# Patient Record
Sex: FEMALE | Race: BLACK OR AFRICAN AMERICAN | Employment: UNEMPLOYED | URBAN - METROPOLITAN AREA
[De-identification: names, ages, dates, MRNs, and addresses within clinical notes are randomized per-mention and may not be internally consistent; named-entity substitution may affect disease eponyms.]

---

## 2018-09-06 ENCOUNTER — HOSPITAL ENCOUNTER (OUTPATIENT)
Dept: PHYSICAL THERAPY | Age: 43
Discharge: HOME | End: 2018-09-06
Payer: MEDICAID

## 2018-09-06 PROCEDURE — 97162 PT EVAL MOD COMPLEX 30 MIN: CPT

## 2018-09-06 PROCEDURE — 97530 THERAPEUTIC ACTIVITIES: CPT

## 2018-09-06 PROCEDURE — 97110 THERAPEUTIC EXERCISES: CPT

## 2018-09-06 PROCEDURE — 97016 VASOPNEUMATIC DEVICE THERAPY: CPT

## 2018-09-06 NOTE — PROGRESS NOTES
[facility] [address], [place] ram[place], [address] [facility] Phone: [phone]      Fax:  [phone] Plan of Care/ Statement of Necessity for Physical Therapy Services Patient name: Gilma Henderson Start of Care: 2018 Referral source: Raji Ruzi NP : 1975 Medical Diagnosis: Sprain of calcaneofibular ligament of right ankle, subsequent encounter [S93.411D] Onset Date:DOI 17 DOS 18 Treatment Diagnosis: S/P Right Ankle surgery Prior Hospitalization: see medical history Provider#: [id number] Medications: Verified on Patient summary List  
 Comorbidities: HTN, previous surgery, depression, anxiety, BMI >30 Prior Level of Function: prior to injury in December unrestricted and unlimited in daily, work and recreational activities The Plan of Care and following information is based on the information from the initial evaluation. Assessment/ key information:  
Pt is a 40 y.o. Female S/P Right ankle ligament reconstruction on 18. Pt reports initial injury occurred on 17 stepping of bottom step outside home, initially diagnosed with lateral ankle sprain. In February had surgery for fifth metatarsal fx, continued pain and swelling, had second surgery in July. Pt presented to PT NWB with boot on right foot using scooter. Per referral gait training in boot but pt does not have axillary crutches. Will initiate next session with wt shifting in boot in controlled environment. Objective Findings include decreased ROM, visible inflammation, expected strength, gait and balance deficits, visible atrophy and decreased muscle tone. Noted increased sensitivity to light touch, suspect possible fear avoidance. Prior to accident pt was unrestricted in activities. Has 2 young children.  Could benefit from PT to return to daily activities including driving and caring for children.  
  
 Evaluation Complexity History HIGH Complexity :3+ comorbidities / personal factors will impact the outcome/ POC ; Examination HIGH Complexity : 4+ Standardized tests and measures addressing body structure, function, activity limitation and / or participation in recreation  ;Presentation MEDIUM Complexity : Evolving with changing characteristics  ; Clinical Decision Making MEDIUM Complexity : FOTO score of 26-74 Overall Complexity Rating: MEDIUM Problem List: pain affecting function, decrease ROM, decrease strength, edema affecting function, impaired gait/ balance, decrease ADL/ functional abilitiies, decrease activity tolerance and decrease flexibility/ joint mobility Treatment Plan may include any combination of the following: Therapeutic exercise, Therapeutic activities, Neuromuscular re-education, Physical agent/modality, Gait/balance training, Manual therapy, Patient education and Self Care training Patient / Family readiness to learn indicated by: asking questions, trying to perform skills and interest 
Persons(s) to be included in education: patient (P) Barriers to Learning/Limitations: None Patient Goal (s): Be able to walk with a shoe, drive, run again. Use my right foot Patient Self Reported Health Status: fair Rehabilitation Potential: good Short Term Goals: STG- To be accomplished in 2-3 week(s): 1.  Pt will be independent with HEP to encourage prophylaxis. Eval:dispensed Current: NA 
  
2.  Pt will be able to walk WBAT in walking boot without pain to improve functional mobility. Eval:NWB with scooter Current: NA 
  
Long Term Goals: LTG- To be accomplished in 10 week(s): 1.  Pt will demonstrate ability to ascend/descend stairs reciprocally to improve household mobility. Eval:unable Current: NA 
  
2.  Pt will be able to stand and walk >2hours to allow pt to return to daily activities Eval:unable Current: NA 
  
Short Term Goals: STG- To be accomplished in 2 week(s): 
 1.  Pt will be independent with HEP to encourage prophylaxis. Eval:dispensed Current: NA 
  
2.  Pt will be able to walk WBAT in walking boot without pain to improve functional mobility. Eval:NWB with scooter Current: NA 
  
Long Term Goals: LTG- To be accomplished in 10 week(s): 1.  Pt will demonstrate ability to ascend/descend stairs reciprocally to improve household mobility. Eval:unable Current: NA 
  
2.  Pt will be able to stand and walk >2hours performing dynamic activities such as vacuuming, mopping , bending and stooping to allow pt return to job as . Eval:unable Current: NA 
  
3.  Pt increase right ankle ROM to [facility] in all directions to allow for normal gait. Eval: 
AROM/PROM Right Left DF A: lacking 10 P: lacking 8 A: 4*  
PF 45 WFL  
IV 15 30 EV 8 18  
          
   
  
Current: NA 
  
4.  Pt FOTO score will increase by >30 points to show improvement in function. Eval:16 Current: will address at visit 5 
  
 
Frequency / Duration: Patient to be seen 2 times per week for 8-10 weeks. Plan to decrease to 1 time per week at end of PT Patient/ Caregiver education and instruction: Diagnosis, prognosis, self care, activity modification and exercises 
 [x]  Plan of care has been reviewed with FILIPE Garcia, PT, DPT 9/6/2018 1:12 PM 
_____________________________________________________________________ I certify that the above Therapy Services are being furnished while the patient is under my care. I agree with the treatment plan and certify that this therapy is necessary. [facility] Signature:____________Date:_________TIME:________ 
 
[employer], Date and Time must be completed for valid certification ** Please sign and return to [facility] [address], [place], [address] [facility] Phone: [phone]      Fax:  [phone]

## 2018-09-06 NOTE — PROGRESS NOTES
PT DAILY TREATMENT NOTE 3-16 Patient Name: Rut Sinclair Date:2018 : 1975 [x]  Patient  Verified Payor: [employer]UE [employer] MEDICAID / Plan: [address] / Product Type: Managed Care Medicaid / In time: 11:35 am  Out time:12:35 pm  
Total Treatment Time (min): 60 Visit #: 1 of 18 Treatment Area: Sprain of calcaneofibular ligament of right ankle, subsequent encounter [S92.484F] SUBJECTIVE Pain Level (0-10 scale): 3 Any medication changes, allergies to medications, adverse drug reactions, diagnosis change, or new procedure performed?: [x] No    [] Yes (see summary sheet for update) Subjective functional status/changes:   [] No changes reported Hx Present Illness: C/C of Right Ankle pain and swelling In 2017, rolled ankle stepping off of car-port steps Initially went to [facility] - x-rays, given aircast  And crutches, dx with sprain Continued to have swelling and pain from 2017 - 2018, went to [facility] - dx with fx of fifth metatarsal, saw PA on Thursday and had surgery following Monday/Tuesday Had screw placement and casted/splinted x 6 wks NWB, put in boot WBAT in boot from February - July with continued pain 2018 S/P Right ankle ATFL and \"ligament reconstruction\" Last appointment with PA in August - per pt allowed to [facility] a couple steps every hour than then get back on the scooter. \" 
PLOF: prior to injury in December unrestricted and unlimited in daily, work and recreational activities Sharp pains with moving ankle and with weight bearing Pain:  _8__/10 max       __2_/10 min     _3___/10 now    Location: indicates lateral right ankle and malleoli  
   [x] Sharp    [] Dull      [] Burning     []  Aching     [x] Throbbing      [] Tingling 
   [] Other:  
    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 and 3 y.o., single story home Recreational Activities: active with children Patient Goal(s): \"Be able to walk with a shoe, drive, run again. Use my right foot. \" 
 
Barriers: []pain []financial []time []transportation []other Motivation: high Substance use: []Alcohol []Tobacco []other:  
FABQ Score: []low []elevate Cognition: A & O x 4 Other OBJECTIVE Modality rationale: decrease inflammation and decrease pain to improve the patients ability to perform daily activities with decreased pain and symptom levels Min Type Additional Details  
 [] Estim:  []Unatt       []IFC  []Premod []Other:  []w/ice   []w/heat Position: Location:  
 [] Estim: []Att    []TENS instruct  []NMES []Other:  []w/US   []w/ice   []w/heat Position: Location:  
 []  Traction: [] Cervical       []Lumbar 
                     [] Prone          []Supine []Intermittent   []Continuous Lbs: 
[] before manual 
[] after manual  
 []  Ultrasound: []Continuous   [] Pulsed []1MHz   []3MHz Location: 
W/cm2:  
 []  Iontophoresis with dexamethasone Location: [] Take home patch  
[] In clinic  
 []  Ice     []  heat 
[]  Ice massage 
[]  Laser  
[]  Anodyne Position: Location:  
 []  Laser with stim 
[]  Other: Position: Location:  
10 [x]  Vasopneumatic Device Pressure:       [] lo [x] med [] hi  
Temperature: [x] lo [] med [] hi  
[x] Skin assessment post-treatment:  [x]intact []redness- no adverse reaction 
  []redness  adverse reaction:  
 
 
20 min [x]Eval                  []Re-Eval  
 
10 min Therapeutic Exercise:  [x] See flow sheet :  
Rationale: increase ROM, increase strength, improve coordination and increase proprioception to improve the patients ability to perform daily activities with decreased pain and symptom levels With 
 [x] TE 
 [x] TA - 10 min 
 [] neuro 
 [] other: Patient Education: [x] Review HEP [] Progressed/Changed HEP based on:  
[] positioning   [] body mechanics   [] transfers   [] heat/ice application   
[x] other: reviewed exam findings, anatomy involved, POC, use of tubi-, ice and elevation, desensitization techniques and importance of touching incision and ankle at home Other Objective/Functional Measures: Movement/gait:  NWB foot in boot, using a scooter Visual Inspection: Thoracic: flattened Lumbar: increased Incisions: well healed at anterior medial and lateral ankle portal sites and incision at lateral malleoli Palpation: increased sensitivity to light palpation, TTP at incisions Decrease tone at musculature of right LE Visible atrophy at gastroc and soleus Mild increase in temp to palpation of ankle AROM/PROM Right Left DF A: lacking 10 P: lacking 8 A: 4*  
PF 45 WFL  
IV 15 30 EV 8 18 Strength Right Left PF NT 4  
DF NT 5 IV NT 5  
EV NT 5 Special Tests Right Left Other Right Left Girth - malleoli 27.8 cm 25.5 cm Girth - Figure 8 53.1 50.4 Other Comments:  
 
Pain Level (0-10 scale) post treatment: 8 
 
ASSESSMENT/Changes in Function:  
Pt is a 40 y.o. Female S/P Right ankle ligament reconstruction on 7/25/18. Pt reports initial injury occurred on 12/23/17 stepping of bottom step outside home, initially diagnosed with lateral ankle sprain. In February had surgery for fifth metatarsal fx, continued pain and swelling, had second surgery in July. Pt presented to PT NWB with boot on right foot using scooter. Per referral gait training in boot but pt does not have axillary crutches.  Will initiate next session with wt shifting in boot in controlled environment. Objective Findings include decreased ROM, visible inflammation, expected strength, gait and balance deficits, visible atrophy and decreased muscle tone. Noted increased sensitivity to light touch, suspect possible fear avoidance. Prior to accident pt was unrestricted in activities. Has 2 young children. Could benefit from PT to return to daily activities including driving and caring for children. Patient will continue to benefit from skilled PT services to modify and progress therapeutic interventions, address functional mobility deficits, address ROM deficits, address strength deficits, analyze and address soft tissue restrictions, analyze and cue movement patterns, analyze and modify body mechanics/ergonomics, assess and modify postural abnormalities, address imbalance/dizziness and instruct in home and community integration to attain remaining goals. [x]  See Plan of Care 
[]  See progress note/recertification 
[]  See Discharge Summary Progress towards goals / Updated goals: 
Short Term Goals: STG- To be accomplished in 2-3 week(s): 1. Pt will be independent with HEP to encourage prophylaxis. Eval:dispensed Current: NA 
   
2. Pt will be able to walk WBAT in walking boot without pain to improve functional mobility. Eval:NWB with scooter Current: NA Long Term Goals: LTG- To be accomplished in 10 week(s): 1. Pt will demonstrate ability to ascend/descend stairs reciprocally to improve household mobility. Eval:unable Current: NA 
   
2. Pt will be able to stand and walk >2hours to allow pt to return to daily activities Eval:unable Current: NA Short Term Goals: STG- To be accomplished in 2 week(s): 1. Pt will be independent with HEP to encourage prophylaxis. Eval:dispensed Current: NA 
   
2. Pt will be able to walk WBAT in walking boot without pain to improve functional mobility. Eval:NWB with scooter Current: NA 
   
 Long Term Goals: LTG- To be accomplished in 10 week(s): 1. Pt will demonstrate ability to ascend/descend stairs reciprocally to improve household mobility. Eval:unable Current: NA 
   
2. Pt will be able to stand and walk >2hours performing dynamic activities such as vacuuming, mopping , bending and stooping to allow pt return to job as . Eval:unable Current: NA 
 
3. Pt increase right ankle ROM to [facility] in all directions to allow for normal gait. Eval: 
AROM/PROM Right Left DF A: lacking 10 P: lacking 8 A: 4*  
PF 45 WFL  
IV 15 30 EV 8 18  
       
  
 
Current: NA 
   
4. Pt FOTO score will increase by >30 points to show improvement in function. Eval:16 Current: will address at visit 5 PLAN [x]  Upgrade activities as tolerated     []  Continue plan of care 
[]  Update interventions per flow sheet      
[]  Discharge due to:_ 
[]  Other:_ Mark Garcia PT, DPT 9/6/2018  11:31 AM 
 
No future appointments.

## 2018-09-12 ENCOUNTER — APPOINTMENT (OUTPATIENT)
Dept: PHYSICAL THERAPY | Age: 43
End: 2018-09-12
Payer: MEDICAID

## 2018-09-14 ENCOUNTER — APPOINTMENT (OUTPATIENT)
Dept: PHYSICAL THERAPY | Age: 43
End: 2018-09-14
Payer: MEDICAID

## 2018-09-19 ENCOUNTER — APPOINTMENT (OUTPATIENT)
Dept: PHYSICAL THERAPY | Age: 43
End: 2018-09-19
Payer: MEDICAID

## 2018-09-21 ENCOUNTER — APPOINTMENT (OUTPATIENT)
Dept: PHYSICAL THERAPY | Age: 43
End: 2018-09-21
Payer: MEDICAID

## 2018-09-26 ENCOUNTER — APPOINTMENT (OUTPATIENT)
Dept: PHYSICAL THERAPY | Age: 43
End: 2018-09-26
Payer: MEDICAID

## 2018-09-28 ENCOUNTER — APPOINTMENT (OUTPATIENT)
Dept: PHYSICAL THERAPY | Age: 43
End: 2018-09-28
Payer: MEDICAID

## 2018-10-03 ENCOUNTER — APPOINTMENT (OUTPATIENT)
Dept: PHYSICAL THERAPY | Age: 43
End: 2018-10-03
Payer: MEDICAID

## 2018-10-05 ENCOUNTER — HOSPITAL ENCOUNTER (OUTPATIENT)
Dept: PHYSICAL THERAPY | Age: 43
Discharge: HOME | End: 2018-10-05
Payer: MEDICAID

## 2018-10-05 PROCEDURE — 97530 THERAPEUTIC ACTIVITIES: CPT

## 2018-10-05 PROCEDURE — 97016 VASOPNEUMATIC DEVICE THERAPY: CPT

## 2018-10-05 PROCEDURE — 97110 THERAPEUTIC EXERCISES: CPT

## 2018-10-05 NOTE — PROGRESS NOTES
PT DAILY TREATMENT NOTE  Patient Name: Hood Iyer Date:10/5/2018 : 1975 [x]  Patient  Verified Payor: BLUE [employer]OSS MEDICAID / Plan: [address] / Product Type: Managed Care Medicaid / In time:10:00  Out time:11:00 Total Treatment Time (min): 60 Visit #: 2 of 18 Treatment Area: Sprain of calcaneofibular ligament of right ankle, subsequent encounter [L50.067P] SUBJECTIVE Pain Level (0-10 scale): 4 Any medication changes, allergies to medications, adverse drug reactions, diagnosis change, or new procedure performed?: [x] No    [] Yes (see summary sheet for update) Subjective functional status/changes:   [] No changes reported \"I have not been able to come in because of personal reasons. I have tried to do stuff at home. \"  
 
OBJECTIVE Modality rationale: decrease edema, decrease inflammation and decrease pain to improve the patients ability to perform pain free ADL's   
Min Type Additional Details  
 [] Estim:  []Unatt       []IFC  []Premod []Other:  []w/ice   []w/heat Position: Location:  
 [] Estim: []Att    []TENS instruct  []NMES []Other:  []w/US   []w/ice   []w/heat Position: Location:  
 []  Traction: [] Cervical       []Lumbar 
                     [] Prone          []Supine []Intermittent   []Continuous Lbs: 
[] before manual 
[] after manual  
 []  Ultrasound: []Continuous   [] Pulsed []1MHz   []3MHz W/cm2: 
Location:  
 []  Iontophoresis with dexamethasone Location: [] Take home patch  
[] In clinic  
 []  Ice     []  heat 
[]  Ice massage 
[]  Laser  
[]  Anodyne Position: Location:  
 []  Laser with stim 
[]  Other:  Position: Location:  
10 [x]  Vasopneumatic Device  Pressure:       [x] lo [] med [] hi  
Temperature: [x] lo [] med [] hi  
[x] Skin assessment post-treatment:  []intact []redness- no adverse reaction 35 min Therapeutic Exercise:  [x] See flow sheet :  
Rationale: increase ROM, increase strength, improve coordination and improve balance to improve the patients ability to [perform pain free ADL's  
 
15 min Therapeutic Activity:  [x]  See flow sheet :  
Rationale: increase ROM, increase strength, improve coordination and improve balance  to improve the patients ability to perform pain free ALD's With 
 [] TE 
 [] TA 
 [] neuro 
 [] other: Patient Education: [x] Review HEP [] Progressed/Changed HEP based on:  
[] positioning   [] body mechanics   [] transfers   [] heat/ice application   
[] other:   
 
Other Objective/Functional Measures: No change in ROM since eval  
AROM/PROM Right Left DF A: lacking 10 P: lacking 8 A: 4*  
PF 45 WFL  
IV 15 30 EV 8 18  
          
FOTO: see chart     
 
Pain Level (0-10 scale) post treatment:3 
 
ASSESSMENT/Changes in Function: Pt has not been in therapy for one month due to personal reasons. Pt presented in clinic with walkning boot, however reported she has been without boot at home and when doing HEP. Performed exercises without boot, reviewed gait with SPC fo support. Held most standing therex this session. Challenged with ROM exercises. Discussed importance of compliance with therapy. Patient will continue to benefit from skilled PT services to modify and progress therapeutic interventions, address functional mobility deficits, address ROM deficits, address strength deficits, analyze and address soft tissue restrictions, analyze and cue movement patterns, analyze and modify body mechanics/ergonomics, assess and modify postural abnormalities, address imbalance/dizziness and instruct in home and community integration to attain remaining goals. []  See Plan of Care 
[]  See progress note/recertification 
[]  See Discharge Summary Progress towards goals / Updated goals: 
  
Short Term Goals: STG- To be accomplished in 2-3 week(s): 
 1.  Pt will be independent with HEP to encourage prophylaxis. Eval:dispensed Current: Progressing per pt report of compliance with HEP  
   
2.  Pt will be able to walk WBAT in walking boot without pain to improve functional mobility. Eval:NWB with scooter Current: Progressing : Pt presented in therapy with no scooter, cont to wear walking boot  
   
 
 
Long Term Goals: LTG- To be accomplished in 10 week(s): 1.  Pt will demonstrate ability to ascend/descend stairs reciprocally to improve household mobility. Eval:unable Current: no progress due to compliance  
   
2.  Pt will be able to stand and walk >2hours performing dynamic activities such as vacuuming, mopping , bending and stooping to allow pt return to job as . Eval:unable Current: no progress due to compliance 3.  Pt increase right ankle ROM to [facility] in all directions to allow for normal gait. Eval: 
AROM/PROM Right Left DF A: lacking 10 P: lacking 8 A: 4*  
PF 45 WFL  
IV 15 30 EV 8 18  
          
   
   
Current: NA 
   
4.  Pt FOTO score will increase by >30 points to show improvement in function. Eval:16 Current: see chart  
   
  
 
PLAN [x]  Upgrade activities as tolerated     [x]  Continue plan of care 
[]  Update interventions per flow sheet      
[]  Discharge due to:_ 
[]  Other:_ Albert Cullen, PTA 10/5/2018  10:02 AM 
 
No future appointments.

## 2018-10-12 ENCOUNTER — APPOINTMENT (OUTPATIENT)
Dept: PHYSICAL THERAPY | Age: 43
End: 2018-10-12
Payer: MEDICAID

## 2018-10-16 ENCOUNTER — HOSPITAL ENCOUNTER (OUTPATIENT)
Dept: PHYSICAL THERAPY | Age: 43
End: 2018-10-16
Payer: MEDICAID

## 2018-10-18 ENCOUNTER — HOSPITAL ENCOUNTER (OUTPATIENT)
Dept: PHYSICAL THERAPY | Age: 43
End: 2018-10-18
Payer: MEDICAID

## 2018-10-25 ENCOUNTER — HOSPITAL ENCOUNTER (OUTPATIENT)
Dept: PHYSICAL THERAPY | Age: 43
Discharge: HOME | End: 2018-10-25
Payer: MEDICAID

## 2018-10-25 PROCEDURE — 97110 THERAPEUTIC EXERCISES: CPT

## 2018-10-25 PROCEDURE — 97164 PT RE-EVAL EST PLAN CARE: CPT

## 2018-10-25 PROCEDURE — 97016 VASOPNEUMATIC DEVICE THERAPY: CPT

## 2018-10-25 PROCEDURE — 97530 THERAPEUTIC ACTIVITIES: CPT

## 2018-10-25 PROCEDURE — 97140 MANUAL THERAPY 1/> REGIONS: CPT

## 2018-10-25 NOTE — PROGRESS NOTES
PT DAILY TREATMENT NOTE  Patient Name: Whit Greer Date:10/25/2018 : 1975 [x]  Patient  Verified Payor: BLUE [employer] MEDICAID / Plan: [address] / Product Type: Managed Care Medicaid / In time:10:30 am  Out time:11:44 am 
Total Treatment Time (min): 74 Visit #: 3 of 18 Treatment Area: Sprain of calcaneofibular ligament of right ankle, subsequent encounter [Y12.362Y] SUBJECTIVE Pain Level (0-10 scale): 3 Any medication changes, allergies to medications, adverse drug reactions, diagnosis change, or new procedure performed?: [x] No    [] Yes (see summary sheet for update) Subjective functional status/changes:   [] No changes reported \"I am doing well. \" 
Pt reports getting brace 4 days ago. OBJECTIVE Modality rationale: decrease inflammation and decrease pain to improve the patients ability to perform daily activities with decreased pain and symptom levels Min Type Additional Details  
 [] Estim:  []Unatt       []IFC  []Premod []Other:  []w/ice   []w/heat Position: Location:  
 [] Estim: []Att    []TENS instruct  []NMES []Other:  []w/US   []w/ice   []w/heat Position: Location:  
 []  Traction: [] Cervical       []Lumbar 
                     [] Prone          []Supine []Intermittent   []Continuous Lbs: 
[] before manual 
[] after manual  
 []  Ultrasound: []Continuous   [] Pulsed []1MHz   []3MHz W/cm2: 
Location:  
 []  Iontophoresis with dexamethasone Location: [] Take home patch  
[] In clinic  
 []  Ice     []  heat 
[]  Ice massage 
[]  Laser  
[]  Anodyne Position: Location:  
 []  Laser with stim 
[]  Other:  Position: Location:  
10 [x]  Vasopneumatic Device Pressure:       [] lo [x] med [] hi  
Temperature: [x] lo [] med [] hi  
[] Skin assessment post-treatment:  []intact []redness- no adverse reaction 
  []redness  adverse reaction: 10 min -Eval                  X Re-Eval  
 
 
34 min Therapeutic Exercise:  [x] See flow sheet :  
Rationale: increase ROM, increase strength, improve coordination and increase proprioception to improve the patients ability to perform daily activities with decreased pain and symptom levels 10 min Therapeutic Activity:  [x]  See flow sheet : included pt education regarding application of figure 8 brace Rationale: increase ROM, increase strength, improve coordination and increase proprioception  to improve the patients ability to perform daily activities with decreased pain and symptom levels 10 min Manual Therapy:  Gentle TC distraction, STM to incisions and post TC glides grade III/IV Rationale: decrease pain, increase ROM and increase tissue extensibility to perform daily activities with decreased pain and symptom levels With 
 [] TE 
 [x] TA 
 [] neuro 
 [] other: Patient Education: [x] Review HEP [] Progressed/Changed HEP based on:  
[] positioning   [] body mechanics   [] transfers   [] heat/ice application   
[] other:   
 
Other Objective/Functional Measures:  
AROM/PROM Right Left DF A: lacking 5* P: 4* A: 4*  
PF 30* WFL  
IV 20 30 EV 10 18 Pain Level (0-10 scale) post treatment: 2 
 
ASSESSMENT/Changes in Function:  
Pt has only been seen for 3 visits including initial evaluation. Evaluation was on 9/6/18 - pt stated had personal conflicts and then transportation issues. At evaluation pt was NWB, at third follow-up presented in shoes with figure 8 brace on right ankle. Stated received from MD 4-5 days ago. Continues to have low level pain, ROM restrictions, gait/balance/stability deficits and expected strength deficits. Have updated HEP. Continued increased fear avoidance and deconditioned state. Updated HEP and educated on importance of compliance with attendance to HEP. Pt could benefit from continued skilled PT to address, ROM, strength, stability and gait. Appropriately challenged with progression of interventions. Updated HEP. Noted possible deconditioning due to 2 surgeries and increased time NWB on Right LE this year. Patient will continue to benefit from skilled PT services to modify and progress therapeutic interventions, address functional mobility deficits, address ROM deficits, address strength deficits, analyze and address soft tissue restrictions, analyze and cue movement patterns, analyze and modify body mechanics/ergonomics, assess and modify postural abnormalities and instruct in home and community integration to attain remaining goals. []  See Plan of Care [x]  See progress note/recertification 
[]  See Discharge Summary Progress towards goals / Updated goals: 
Short Term Goals: STG- To be accomplished in 2-3 week(s): 1.  Pt will be independent with HEP to encourage prophylaxis. Eval:dispensed Current: Progressing per pt report of compliance with HEP  
   
2.  Pt will be able to walk WBAT in walking boot without pain to improve functional mobility. Eval:NWB with scooter Current: Progressing : Pt presented in therapy with no scooter, cont to wear walking boot  
   
  
  
Long Term Goals: LTG- To be accomplished in 10 week(s): 1.  Pt will demonstrate ability to ascend/descend stairs reciprocally to improve household mobility. Eval:unable Current: no progress, initiated step ups this session 
   
2.  Pt will be able to stand and walk >2hours performing dynamic activities such as vacuuming, mopping , bending and stooping to allow pt care for children and return to PLOF. Eval:unable Current: per pt challenged with walking, standing and daily activities  
  
3.  Pt increase right ankle ROM to [facility] in all directions to allow for normal gait. Eval: 
AROM/PROM Right Left DF A: lacking 10 P: lacking 8 A: 4*  
PF 45 WFL  
IV 15 30 EV 8 18  
          
   
   
Current: progressing AROM/PROM Right Left DF A: lacking 5* 
 P: 4* A: 4*  
PF 30* WFL  
IV 20 30 EV 10 18  
  
   
4.  Pt FOTO score will increase by >30 points to show improvement in function. Eval:16 Current: Reassess at visit 5  
   
PLAN [x]  Upgrade activities as tolerated     []  Continue plan of care 
[]  Update interventions per flow sheet      
[]  Discharge due to:_ 
[]  Other:_ Jen Swan PT, DPT 10/25/2018  10:34 AM 
 
Future Appointments Date Time Provider Regina Momin 10/30/2018 11:30 AM FILIPE Perry [facility]  
11/2/2018 10:00 AM FILIPE Escobedo [facility]

## 2018-10-25 NOTE — PROGRESS NOTES
[facility] at the [address], [place] Regina ram[place], [address] [facility] Phone: [phone]      Fax:  [phone] Progress Note Patient name: Briana Randall Start of Care: 2018 Referral source: Emilia Izaguirre NP : 1975 Medical Diagnosis: Sprain of calcaneofibular ligament of right ankle, subsequent encounter [S93.411D] 
  Onset Date:DOI 17 DOS 18 Treatment Diagnosis: S/P Right Ankle surgery Prior Hospitalization: see medical history Provider#: [id number] Medications: Verified on Patient summary List  
 Comorbidities: HTN, previous surgery, depression, anxiety, BMI >30 Prior Level of Function: prior to injury in December unrestricted and unlimited in daily, work and recreational activities Visits from Start of Care: 3    Missed Visits: 3 Progress Toward Goals:  
Short Term Goals: STG- To be accomplished in 2-3 week(s): 1.  Pt will be independent with HEP to encourage prophylaxis. Eval:dispensed Current: Progressing per pt report of compliance with HEP  
   
2.  Pt will be able to walk WBAT in walking boot without pain to improve functional mobility. Eval:NWB with scooter Current: Progressing : Pt presented in therapy with no scooter, cont to wear walking boot  
   
  
  
Long Term Goals: LTG- To be accomplished in 10 week(s): 1.  Pt will demonstrate ability to ascend/descend stairs reciprocally to improve household mobility. Eval:unable Current: no progress, initiated step ups this session 
   
2.  Pt will be able to stand and walk >2hours performing dynamic activities such as vacuuming, mopping , bending and stooping to allow pt care for children and return to OF. Eval:unable Current: per pt challenged with walking, standing and daily activities  
  
3.  Pt increase right ankle ROM to [facility] in all directions to allow for normal gait. Eval: AROM/PROM Right Left DF A: lacking 10 P: lacking 8 A: 4*  
PF 45 WFL  
IV 15 30 EV 8 18  
          
   
   
Current: progressing AROM/PROM Right Left DF A: lacking 5* P: 4* A: 4*  
PF 30* WFL  
IV 20 30 EV 10 18  
             
   4.  Pt FOTO score will increase by >30 points to show improvement in function. Eval:16 Current: Reassess at visit 5  
   
Key Functional Changes:  
Pt has only been seen for 3 visits including initial evaluation. Evaluation was on 9/6/18 - pt stated had personal conflicts and then transportation issues. At evaluation pt was NWB, at third follow-up presented in shoes with figure 8 brace on right ankle. Stated received from MD 4-5 days ago. Continues to have low level pain, ROM restrictions, gait/balance/stability deficits and expected strength deficits. Have updated HEP. Continued increased fear avoidance and deconditioned state. Updated HEP and educated on importance of compliance with attendance to HEP. Pt could benefit from continued skilled PT to address, ROM, strength, stability and gait. Updated Goals: to be achieved in 4 weeks: 
 Same as unmet above ASSESSMENT/RECOMMENDATIONS: 
[x]Continue therapy per initial plan/protocol at a frequency of  2 x per week for 4 weeks []Continue therapy with the following recommended changes:_____________________      _____________________________________________________________________ []Discontinue therapy progressing towards or have reached established goals []Discontinue therapy due to lack of appreciable progress towards goals []Discontinue therapy due to lack of attendance or compliance []Await Physician's recommendations/decisions regarding therapy []Other:________________________________________________________________ Thank you for this referral.  
Janette Iqbal, PT, DPT 10/25/2018 12:58 PM 
NOTE TO PHYSICIAN:  PLEASE COMPLETE THE ORDERS BELOW AND  
FAX TO [facility]: [phone] If you are unable to process this request in 24 hours please contact our office: [phone] []  I have read the above report and request that my patient continue as recommended. []  I have read the above report and request that my patient continue therapy with the following changes/special instructions:________________________________________ []I have read the above report and request that my patient be discharged from therapy.  
 
[de-identified] Signature:____________Date:_________TIME:________ 
 
[employer], Date and Time must be completed for valid certification **

## 2018-10-30 ENCOUNTER — HOSPITAL ENCOUNTER (OUTPATIENT)
Dept: PHYSICAL THERAPY | Age: 43
Discharge: HOME | End: 2018-10-30
Payer: MEDICAID

## 2018-10-30 PROCEDURE — 97016 VASOPNEUMATIC DEVICE THERAPY: CPT

## 2018-10-30 PROCEDURE — 97110 THERAPEUTIC EXERCISES: CPT

## 2018-10-30 PROCEDURE — 97140 MANUAL THERAPY 1/> REGIONS: CPT

## 2018-10-30 NOTE — PROGRESS NOTES
PT DAILY TREATMENT NOTE - [facility]  Patient Name: Hood Iyer Date:10/30/2018 : 1975 [x]  Patient  Verified Payor: [employer]ICAID / Plan: [address] / Product Type: Managed Care Medicaid / In time:10:36  Out time:12:46 Total Treatment Time (min): 70 Total Timed Codes (min): 60 
1:1 Treatment Time ( only): 45 Visit #: 4 of 18 Treatment Area: Sprain of calcaneofibular ligament of right ankle, subsequent encounter [L25.706B] SUBJECTIVE Pain Level (0-10 scale): 3 Any medication changes, allergies to medications, adverse drug reactions, diagnosis change, or new procedure performed?: [x] No    [] Yes (see summary sheet for update) Subjective functional status/changes:   [] No changes reported \"Its tender. I want to be able to walk on it. \" OBJECTIVE Modality rationale: decrease edema, decrease inflammation and decrease pain to improve the patients ability to perform pain free ADl's   
Min Type Additional Details  
 [] Estim:  []Unatt       []IFC  []Premod []Other:  []w/ice   []w/heat Position: Location:  
 [] Estim: []Att    []TENS instruct  []NMES []Other:  []w/US   []w/ice   []w/heat Position: Location:  
 []  Traction: [] Cervical       []Lumbar 
                     [] Prone          []Supine []Intermittent   []Continuous Lbs: 
[] before manual 
[] after manual  
 []  Ultrasound: []Continuous   [] Pulsed []1MHz   []3MHz W/cm2: 
Location:  
 []  Iontophoresis with dexamethasone Location: [] Take home patch  
[] In clinic  
 []  Ice     []  heat 
[]  Ice massage 
[]  Laser  
[]  Anodyne Position: Location:  
 []  Laser with stim 
[]  Other:  Position: Location:  
10 [x]  Vasopneumatic Device right ankle  Pressure:       [x] lo [] med [] hi  
Temperature: [x] lo [] med [] hi  
[x] Skin assessment post-treatment:  []intact []redness- no adverse reaction []redness  adverse reaction:  
 
 
45 min Therapeutic Exercise:  [x] See flow sheet :  
Rationale: increase ROM, increase strength and improve coordination to improve the patients ability to perform pain free ADl's  
 
15 min Manual Therapy:  Gentle Graston technique with GT-3 to lateral scar and TC joint. Manual TC mobs in post direction with DF. Rationale: decrease pain, increase ROM and increase tissue extensibility to perform pain free ADL's With 
 [] TE 
 [] TA 
 [] neuro 
 [] other: Patient Education: [x] Review HEP [] Progressed/Changed HEP based on:  
[] positioning   [] body mechanics   [] transfers   [] heat/ice application   
[] other:   
 
Other Objective/Functional Measures:   
Poor balance with SLS on righ ton foam.  
Very TTP along lateral foot. Pain Level (0-10 scale) post treatment: 4 
 
ASSESSMENT/Changes in Function: Pt continued to c/o pain in lateral foot with ambulation and very TTP. Noted inflammation pocket at lateral malleolus. Tolerated Graston well however very tender. Pt was also very challenged with SLS. Increased pain reported after treatment . Patient will continue to benefit from skilled PT services to modify and progress therapeutic interventions, address functional mobility deficits, address ROM deficits, address strength deficits, analyze and address soft tissue restrictions, analyze and cue movement patterns, analyze and modify body mechanics/ergonomics, assess and modify postural abnormalities, address imbalance/dizziness and instruct in home and community integration to attain remaining goals. []  See Plan of Care 
[]  See progress note/recertification 
[]  See Discharge Summary Progress towards goals / Updated goals: 
Progress Toward Goals:  
Short Term Goals: STG- To be accomplished in 2-3 week(s): 1.  Pt will be independent with HEP to encourage prophylaxis. Eval:dispensed Current: Progressing per pt report of compliance with HEP  
    
2.  Pt will be able to walk WBAT in walking boot without pain to improve functional mobility. Eval:NWB with scooter Current: Progressing : Pt presented in therapy with no scooter, cont to wear walking boot  
   
  
  
Long Term Goals: LTG- To be accomplished in 10 week(s): 1.  Pt will demonstrate ability to ascend/descend stairs reciprocally to improve household mobility. Eval:unable Current: no progress, initiated step ups this session 
   
2.  Pt will be able to stand and walk >2hours performing dynamic activities such as vacuuming, mopping , bending and stooping to allow pt care for children and return to OF. Eval:unable Current: per pt challenged with walking, standing and daily activities  
  
3.  Pt increase right ankle ROM to [facility] in all directions to allow for normal gait. Eval: 
AROM/PROM Right Left DF A: lacking 10 P: lacking 8 A: 4*  
PF 45 WFL  
IV 15 30 EV 8 18  
          
   
   
Current: progressing AROM/PROM Right Left DF A: lacking 5* P: 4* A: 4*  
PF 30* WFL  
IV 20 30 EV 10 18  
  
   
4.  Pt FOTO score will increase by >30 points to show improvement in function. Eval:16 Current: Reassess at visit 5  
   
Key Functional Changes:  
Pt has only been seen for 3 visits including initial evaluation. Evaluation was on 9/6/18 - pt stated had personal conflicts and then transportation issues. At evaluation pt was NWB, at third follow-up presented in shoes with figure 8 brace on right ankle. Stated received from MD 4-5 days ago. Continues to have low level pain, ROM restrictions, gait/balance/stability deficits and expected strength deficits. Have updated HEP. Continued increased fear avoidance and deconditioned state. Updated HEP and educated on importance of compliance with attendance to HEP. Pt could benefit from continued skilled PT to address, ROM, strength, stability and gait.  
  
  
Updated Goals: to be achieved in 4 weeks: 
            Same as unmet above PLAN 
 [x]  Upgrade activities as tolerated     [x]  Continue plan of care 
[]  Update interventions per flow sheet      
[]  Discharge due to:_ 
[]  Other:_ Immanuel FILIPE Villanueva 10/30/2018  11:51 AM 
 
Future Appointments Date Time Provider Regina Momin 11/2/2018 10:00 AM Leny Avila PTA [facility]

## 2018-11-02 ENCOUNTER — APPOINTMENT (OUTPATIENT)
Dept: PHYSICAL THERAPY | Age: 43
End: 2018-11-02
Payer: MEDICAID

## 2018-11-30 ENCOUNTER — HOSPITAL ENCOUNTER (OUTPATIENT)
Dept: PHYSICAL THERAPY | Age: 43
Discharge: HOME | End: 2018-11-30
Payer: MEDICAID

## 2018-11-30 PROCEDURE — 97016 VASOPNEUMATIC DEVICE THERAPY: CPT

## 2018-11-30 PROCEDURE — 97110 THERAPEUTIC EXERCISES: CPT

## 2018-11-30 PROCEDURE — 97530 THERAPEUTIC ACTIVITIES: CPT

## 2018-11-30 NOTE — PROGRESS NOTES
PT DAILY TREATMENT NOTE  Patient Name: Fadumo Dillard Date:2018 : 1975 [x]  Patient  Verified Payor: BLUE [employer]OSS MEDICAID / Plan: [address] / Product Type: Managed Care Medicaid / In time:9:46  Out time:11:00 Total Treatment Time (min): 74 Visit #: 5 of 18 Treatment Area: Sprain of calcaneofibular ligament of right ankle, subsequent encounter [H01.949B] SUBJECTIVE Pain Level (0-10 scale): 4 Any medication changes, allergies to medications, adverse drug reactions, diagnosis change, or new procedure performed?: [x] No    [] Yes (see summary sheet for update) Subjective functional status/changes:   [] No changes reported \"I still have a pain on the outside of the foot. I don't do stairs. I go to the doctor net week. \"  
 
OBJECTIVE Modality rationale: decrease inflammation and decrease pain to improve the patients ability to perform pain free ADLS\" Type Additional Details  
[] Estim:  []Unatt       []IFC  []Premod []Other:  []w/ice   []w/heat Position: Location:  
[] Estim: []Att    []TENS instruct  []NMES []Other:  []w/US   []w/ice   []w/heat Position: Location:  
[]  Traction: [] Cervical       []Lumbar 
                     [] Prone          []Supine []Intermittent   []Continuous Lbs: 
[] before manual 
[] after manual  
[]  Ultrasound: []Continuous   [] Pulsed []1MHz   []3MHz W/cm2: 
Location:  
[]  Iontophoresis with dexamethasone Location: [] Take home patch  
[] In clinic  
[]  Ice     []  heat 
[]  Ice massage 
[]  Laser  
[]  Anodyne Position: Location:  
[]  Laser with stim 
[]  Other:  Position: Location:  
[x]  Vasopneumatic Device 10\" Left ankle  Pressure:       [x] lo [] med [] hi  
Temperature: [x] lo [] med [] hi  
[x] Skin assessment post-treatment:  []intact []redness- no adverse reaction 
  []redness  adverse reaction: 49 min Therapeutic Exercise:  [x] See flow sheet :  
Rationale: increase ROM, increase strength, improve coordination and improve balance to improve the patients ability to perform pain free ADL\"s 15 min Therapeutic Activity:  [x]  See flow sheet :  
Rationale: increase ROM, increase strength, improve coordination and improve balance  to improve the patients ability to perform pain free ADL's With 
 [] TE 
 [] TA 
 [] neuro 
 [] other: Patient Education: [x] Review HEP [] Progressed/Changed HEP based on:  
[] positioning   [] body mechanics   [] transfers   [] heat/ice application   
[] other:   
 
Other Objective/Functional Measures:   
 
AROM/PROM Right Left DF A: lacking 5* 
P: 6* A: 4*  
PF 50* WFL  
IV 25* 30  
EV 16* 18  
STRENGTH     
DF 3+   
EV 4-   
IV 4- FOTO: 32.34 Pain Level (0-10 scale) post treatment: 1 ASSESSMENT/Changes in Function: Pt has been seen 5x in therapy including initial eval on 9/06/18. Pt has been inconsistent with  attendance in therapy and therefore has demonstrated poor progress and continued pain. Pt   reported limited performance of HEP and has not been compliant . Continues to avoid stairs due to fear and pain , unable to move ankle into eversion/ Inversion with lateral foot pain. Unable to tolerate prolonged standing longer than 15 minutes per pt report due to pain and instability. Pt wants to be able to drive. Patient will continue to benefit from skilled PT services to modify and progress therapeutic interventions, address functional mobility deficits, address ROM deficits, address strength deficits, analyze and address soft tissue restrictions, analyze and cue movement patterns, analyze and modify body mechanics/ergonomics, assess and modify postural abnormalities, address imbalance/dizziness and instruct in home and community integration to attain remaining goals. []  See Plan of Care 
[]  See progress note/recertification []  See Discharge Summary Progress towards goals / Updated goals: 
 
Short Term Goals: STG- To be accomplished in 2-3 week(s): 1.  Pt will be independent with HEP to encourage prophylaxis. Eval:dispensed LAST PN : Progressing per pt report of compliance with HEP  
CURRENT : Not compliant  
   
2.  Pt will be able to walk WBAT in walking boot without pain to improve functional mobility. Eval:NWB with scooter LAST PN: Progressing : Pt presented in therapy with no scooter, cont to wear walking boot  
 
CURRENT: No walking boot. Pt continues to wear wrap up brace.  
   
  
  
Long Term Goals: LTG- To be accomplished in 10 week(s): 1.  Pt will demonstrate ability to ascend/descend stairs reciprocally to improve household mobility. Eval:unable LAST PN:  no progress, initiated step ups this session CURRENT: Progressing slow, continues to c/o pain with steps 
   
2.  Pt will be able to stand and walk >2hours performing dynamic activities such as vacuuming, mopping , bending and stooping to allow pt care for children and return to [facility]. Eval:unable LAST PN : per pt challenged with walking, standing and daily activities CURRENT: Pt is unable to stand longer than 15 minutes for ADL's , per pt report.  
  
3.  Pt increase right ankle ROM to [facility] in all directions to allow for normal gait. Eval: 
AROM/PROM Right Left DF A: lacking 10 P: lacking 8 A: 4*  
PF 45 WFL  
IV 15 30 EV 8 18  
          
   
   
LAST PNt: progressing AROM/PROM Right Left DF A: lacking 5* P: 4* A: 4*  
PF 30* WFL  
IV 20 30 EV 10 18  
  
Current:  
 
AROM/PROM Right Left DF A: lacking 5* 
P: 6* A: 4*  
PF 50* WFL  
IV 25* 30  
EV 16* 18  
STRENGTH     
DF 3+   
EV 4-   
IV 4-   
 
   
4.  Pt FOTO score will increase by >30 points to show improvement in function. Eval:16 LAST PN: take at Visit 5 CURRENT: Progressing 32.34 
 
 
 
PLAN [x]  Upgrade activities as tolerated     [x]  Continue plan of care []  Update interventions per flow sheet      
[]  Discharge due to:_ 
[]  Other:_ hSen Ac PTA 11/30/2018  9:38 AM 
 
Future Appointments Date Time Provider Regina Momin 11/30/2018 10:00 AM Emmy LUEVANOGREGORY [facility]  
12/3/2018 12:30 PM Dena Pandey PTA [facility]

## 2018-12-03 ENCOUNTER — APPOINTMENT (OUTPATIENT)
Dept: PHYSICAL THERAPY | Age: 43
End: 2018-12-03

## 2020-06-09 ENCOUNTER — DOCUMENTATION ONLY (OUTPATIENT)
Dept: PHYSICAL THERAPY | Age: 45
End: 2020-06-09

## 2020-06-09 NOTE — PROGRESS NOTES
[facility]  [address] [facility]  Ph [phone]  Fx [phone]    Physical Therapy Discharge Summary    Patient Name: Fabienne Gaffney : 1975   Treatment/Medical Diagnosis: Right ankle pain   Referral Source: Mariel Hester     Date of Initial Visit: 18 Attended Visits: 5 Missed Visits: 5       SUMMARY OF TREATMENT  Pt attended  initial evaluation and     4     follow-ups and then did not return. Therefore a formal reassessment of goals was not performed. RECOMMENDATIONS  Discontinue physical therapy due to patient not returning. If you have any questions/comments please contact us directly at [phone]. Thank you for allowing us to assist in the care of your patient.     Therapist Signature: Cassi Lerner PT Date: 20     Time: 1:26 PM

## 2020-07-17 NOTE — H&P (VIEW-ONLY)
[address], [address] [place], [address] [facility] 
[address] [facility], [address] [place], [address] 
 [phone] Tisha Ramsey  Patient ID: 
Name:  Kelsey Langley MRN:  [id number] :  1975/44 y.o. Date:  2020 HISTORY OF PRESENT ILLNESS: 
Kelsey Langley is a 38 y.o.  premenopausal female referred by Dr. Radha Katz for abnormal uterine bleeding. She initially presented to gyn with c/o AUB. Patient states that she hadn't had a cycle since 2019, but started a very heavy, painful cycle on 20. Patient is s/p endometrial ablation 2019. TVUS suggestive of endometrial polyps, report below. Dr. Radha Katz offered hysteroscopy to remove polyps, but patient desires hysterectomy. She reports h/o large pelvic abscess after tubal ligation, with exlap and LSO. She developed postoperative PE as well. She presents today for further management. Currently, not having any bleeding. Pathology: 
None Labs: 
None Imaging: 
TVUS Scanned in media ROS:  
As above Patient Active Problem List  
 Diagnosis Date Noted  Severe obesity ([address] 2020 Past Medical History:  
Diagnosis Date  Depression  Hypertension Past Surgical History:  
Procedure Laterality Date  BREAST SURGERY PROCEDURE UNLISTED Augmentation/Lift  HX GYN Left tube and ovary ligation  HX ORTHOPAEDIC Bunion  HX ORTHOPAEDIC Rt ankle OB History Marylene Danish 3 Para 3 Term  AB Living 3 SAB  
   
 TAB Ectopic Molar Multiple Live Births Obstetric Comments 3 vaginal births Social History Tobacco Use  Smoking status: Former Smoker  Smokeless tobacco: Never Used Substance Use Topics  Alcohol use: Not Currently History reviewed. No pertinent family history. Current Outpatient Medications Medication Sig  lamoTRIgine (LaMICtal) 150 mg tablet TAKE 1 TABLET BY MOUTH EVERY DAY  amLODIPine (NORVASC) 10 mg tablet TAKE 1 TABLET BY MOUTH EVERY DAY  Aimovig Autoinjector 140 mg/mL injection INJECT (140MG) BY SUBCUTANEOUS ROUTE EVERY MONTH IN THE ABDOMEN, THIGH, OR OUTER AREA OF UPPER ARM  Desvenlafaxine 100 mg Tb24   
 ibuprofen (MOTRIN) 800 mg tablet  LORazepam (ATIVAN) 1 mg tablet TAKE 1/2 TO 1 TABLET BY MOUTH DAILY AS NEEDED FOR ANXIETY  ergocalciferol (ERGOCALCIFEROL) 1,250 mcg (50,000 unit) capsule TAKE 1 CAPSULE BY MOUTH ONE TIME PER WEEK No current facility-administered medications for this visit. No Known Allergies OBJECTIVE: 
 
Physical Exam 
VITAL SIGNS: Visit Vitals BP (!) 149/102 (BP 1 Location: Right arm, BP Patient Position: Sitting) Pulse 85 Temp 98.3 °F (36.8 °C) (Oral) Resp 12 Ht 5' 7\" (1.702 m) Wt 102.6 kg (226 lb 3.2 oz) LMP 07/07/2020 SpO2 96% BMI 35.43 kg/m² GENERAL MOISES: in no apparent distress and well developed and well nourished MUSCULOSKEL: no joint tenderness, deformity or swelling INTEGUMENT:  warm and dry, no rashes or lesions ABDOMEN . soft, NT, ND, No masses appreciated EXTREMITIES: extremities normal, atraumatic, no cyanosis or edema PELVIC: Vulva and vagina appear normal. Bimanual exam reveals normal uterus and adnexa. RECTAL: deferred YONG SURVEY: Cervical, supraclavicular, axillary and inguinal nodes normal.  
NEURO: Grossly normal  
 
 
 
IMPRESSION/PLAN: 
1. Menorrhagia, endometrial polyp 
 -pt desires definitive treatment 
 -will plan for hysterectomy, right salpingectomy 
 -discussed robotic approach 
 -Risks, benefits and alternatives of surgery discussed in detail. Risks including bleeding, infection, blood clot, injury to nearby organs including bladder, bowel, ureters and blood vessels. -surgery to be scheduled at [facility] on 8/6 The total time spent was 60 minutes regarding this patients diagnosis of menorrhagia and >50% of this time was spent counseling and coordinating care Bernice Florez DO Gynecologic Oncology 7/20/202011:12 AM

## 2020-07-17 NOTE — PROGRESS NOTES
[address] [facility]  [address] [place] [address], [address], [address] [facility]  [address] [facility], [address] [place] [address]  [place], [address]   [phone]  Guy        Patient ID:  Name:  Silvia Adrian  MRN:  [id number]  :  1975/44 y.o. Date:  2020      HISTORY OF PRESENT ILLNESS:  Silvia Adrian is a 38 y.o.  premenopausal female referred by Dr. Cheli Monge for abnormal uterine bleeding. She initially presented to gyn with c/o AUB. Patient states that she hadn't had a cycle since 2019, but started a very heavy, painful cycle on 20. Patient is s/p endometrial ablation 2019. TVUS suggestive of endometrial polyps, report below. Dr. Cheli Monge offered hysteroscopy to remove polyps, but patient desires hysterectomy. She reports h/o large pelvic abscess after tubal ligation, with exlap and LSO. She developed postoperative PE as well. She presents today for further management. Currently, not having any bleeding. Pathology:  None     Labs:  None       Imaging:  TVUS  Scanned in media    ROS:   As above      Patient Active Problem List    Diagnosis Date Noted    Severe obesity ([address] 2020     Past Medical History:   Diagnosis Date    Depression     Hypertension       Past Surgical History:   Procedure Laterality Date    BREAST SURGERY PROCEDURE UNLISTED      Augmentation/Lift     HX GYN      Left tube and ovary ligation     HX ORTHOPAEDIC      Bunion     HX ORTHOPAEDIC      Rt ankle       OB History        3    Para   3    Term                AB        Living   3       SAB        TAB        Ectopic        Molar        Multiple        Live Births              Obstetric Comments   3 vaginal births            Social History     Tobacco Use    Smoking status: Former Smoker    Smokeless tobacco: Never Used   Substance Use Topics    Alcohol use: Not Currently      History reviewed.  No pertinent family history. Current Outpatient Medications   Medication Sig    lamoTRIgine (LaMICtal) 150 mg tablet TAKE 1 TABLET BY MOUTH EVERY DAY    amLODIPine (NORVASC) 10 mg tablet TAKE 1 TABLET BY MOUTH EVERY DAY    Aimovig Autoinjector 140 mg/mL injection INJECT (140MG) BY SUBCUTANEOUS ROUTE EVERY MONTH IN THE ABDOMEN, THIGH, OR OUTER AREA OF UPPER ARM    Desvenlafaxine 100 mg Tb24     ibuprofen (MOTRIN) 800 mg tablet     LORazepam (ATIVAN) 1 mg tablet TAKE 1/2 TO 1 TABLET BY MOUTH DAILY AS NEEDED FOR ANXIETY    ergocalciferol (ERGOCALCIFEROL) 1,250 mcg (50,000 unit) capsule TAKE 1 CAPSULE BY MOUTH ONE TIME PER WEEK     No current facility-administered medications for this visit. No Known Allergies       OBJECTIVE:    Physical Exam  VITAL SIGNS: Visit Vitals  BP (!) 149/102 (BP 1 Location: Right arm, BP Patient Position: Sitting)   Pulse 85   Temp 98.3 °F (36.8 °C) (Oral)   Resp 12   Ht 5' 7\" (1.702 m)   Wt 102.6 kg (226 lb 3.2 oz)   LMP 07/07/2020   SpO2 96%   BMI 35.43 kg/m²      GENERAL MOISES: in no apparent distress and well developed and well nourished   MUSCULOSKEL: no joint tenderness, deformity or swelling   INTEGUMENT:  warm and dry, no rashes or lesions   ABDOMEN . soft, NT, ND, No masses appreciated   EXTREMITIES: extremities normal, atraumatic, no cyanosis or edema   PELVIC: Vulva and vagina appear normal. Bimanual exam reveals normal uterus and adnexa. RECTAL: deferred   YONG SURVEY: Cervical, supraclavicular, axillary and inguinal nodes normal.   NEURO: Grossly normal         IMPRESSION/PLAN:  1. Menorrhagia, endometrial polyp   -pt desires definitive treatment   -will plan for hysterectomy, right salpingectomy   -discussed robotic approach   -Risks, benefits and alternatives of surgery discussed in detail.  Risks including bleeding, infection, blood clot, injury to nearby organs including bladder, bowel, ureters and blood vessels.    -surgery to be scheduled at [facility] on 8/6  The total time spent was 60 minutes regarding this patients diagnosis of menorrhagia and >50% of this time was spent counseling and coordinating care    [facility]  7/20/202011:12 AM

## 2020-07-20 ENCOUNTER — OFFICE VISIT (OUTPATIENT)
Dept: ONCOLOGY | Age: 45
End: 2020-07-20

## 2020-07-20 ENCOUNTER — HOSPITAL ENCOUNTER (OUTPATIENT)
Dept: PREADMISSION TESTING | Age: 45
Discharge: HOME | End: 2020-07-20
Payer: MEDICARE

## 2020-07-20 VITALS
WEIGHT: 226.2 LBS | HEART RATE: 85 BPM | BODY MASS INDEX: 35.5 KG/M2 | SYSTOLIC BLOOD PRESSURE: 149 MMHG | OXYGEN SATURATION: 96 % | DIASTOLIC BLOOD PRESSURE: 102 MMHG | TEMPERATURE: 98.3 F | HEIGHT: 67 IN | RESPIRATION RATE: 12 BRPM

## 2020-07-20 DIAGNOSIS — E66.01 SEVERE OBESITY (HCC): ICD-10-CM

## 2020-07-20 DIAGNOSIS — Z01.818 PREOP TESTING: Primary | ICD-10-CM

## 2020-07-20 DIAGNOSIS — N92.0 MENORRHAGIA WITH REGULAR CYCLE: ICD-10-CM

## 2020-07-20 DIAGNOSIS — Z01.818 PREOP TESTING: ICD-10-CM

## 2020-07-20 LAB
ANION GAP SERPL CALC-SCNC: 3 MMOL/L (ref 3–18)
BASOPHILS # BLD: 0 K/UL (ref 0–0.1)
BASOPHILS NFR BLD: 1 % (ref 0–2)
BUN SERPL-MCNC: 9 MG/DL (ref 7–18)
BUN/CREAT SERPL: 12 (ref 12–20)
CALCIUM SERPL-MCNC: 8.5 MG/DL (ref 8.5–10.1)
CHLORIDE SERPL-SCNC: 104 MMOL/L (ref 100–111)
CO2 SERPL-SCNC: 32 MMOL/L (ref 21–32)
CREAT SERPL-MCNC: 0.75 MG/DL (ref 0.6–1.3)
DIFFERENTIAL METHOD BLD: ABNORMAL
EOSINOPHIL # BLD: 0.1 K/UL (ref 0–0.4)
EOSINOPHIL NFR BLD: 3 % (ref 0–5)
ERYTHROCYTE [DISTWIDTH] IN BLOOD BY AUTOMATED COUNT: 12.6 % (ref 11.6–14.5)
GLUCOSE SERPL-MCNC: 81 MG/DL (ref 74–99)
HCG SERPL-ACNC: <1 MIU/ML (ref 0–10)
HCT VFR BLD AUTO: 41 % (ref 35–45)
HGB BLD-MCNC: 13.7 G/DL (ref 12–16)
LYMPHOCYTES # BLD: 1.7 K/UL (ref 0.9–3.6)
LYMPHOCYTES NFR BLD: 42 % (ref 21–52)
MCH RBC QN AUTO: 31.4 PG (ref 24–34)
MCHC RBC AUTO-ENTMCNC: 33.4 G/DL (ref 31–37)
MCV RBC AUTO: 93.8 FL (ref 74–97)
MONOCYTES # BLD: 0.3 K/UL (ref 0.05–1.2)
MONOCYTES NFR BLD: 6 % (ref 3–10)
NEUTS SEG # BLD: 1.9 K/UL (ref 1.8–8)
NEUTS SEG NFR BLD: 48 % (ref 40–73)
PLATELET # BLD AUTO: 314 K/UL (ref 135–420)
PMV BLD AUTO: 10.7 FL (ref 9.2–11.8)
POTASSIUM SERPL-SCNC: 3.5 MMOL/L (ref 3.5–5.5)
RBC # BLD AUTO: 4.37 M/UL (ref 4.2–5.3)
SODIUM SERPL-SCNC: 139 MMOL/L (ref 136–145)
WBC # BLD AUTO: 4.1 K/UL (ref 4.6–13.2)

## 2020-07-20 PROCEDURE — 85025 COMPLETE CBC W/AUTO DIFF WBC: CPT

## 2020-07-20 PROCEDURE — 84702 CHORIONIC GONADOTROPIN TEST: CPT

## 2020-07-20 PROCEDURE — 36415 COLL VENOUS BLD VENIPUNCTURE: CPT

## 2020-07-20 PROCEDURE — 80048 BASIC METABOLIC PNL TOTAL CA: CPT

## 2020-07-20 RX ORDER — AMLODIPINE BESYLATE 10 MG/1
10 TABLET ORAL DAILY
COMMUNITY
Start: 2020-06-28

## 2020-07-20 RX ORDER — LORAZEPAM 1 MG/1
TABLET ORAL
COMMUNITY
Start: 2020-05-31

## 2020-07-20 RX ORDER — LAMOTRIGINE 150 MG/1
150 TABLET ORAL DAILY
COMMUNITY
Start: 2020-06-28

## 2020-07-20 RX ORDER — ERGOCALCIFEROL 1.25 MG/1
CAPSULE ORAL
COMMUNITY
Start: 2020-07-05

## 2020-07-20 RX ORDER — ERENUMAB-AOOE 140 MG/ML
INJECTION, SOLUTION SUBCUTANEOUS
COMMUNITY
Start: 2020-05-31

## 2020-07-20 RX ORDER — IBUPROFEN 800 MG/1
800 TABLET ORAL AS NEEDED
COMMUNITY
Start: 2020-07-14

## 2020-07-20 RX ORDER — DESVENLAFAXINE 100 MG/1
TABLET, EXTENDED RELEASE ORAL DAILY
COMMUNITY
Start: 2020-07-14

## 2020-07-20 NOTE — LETTER
7/20/20 Patient: Moreno Galicia YOB: 1975 Date of Visit: 7/20/2020 Myranda Logan, [address] [facility] [address] [place] [address] VIA Facsimile: [phone] Dear Myranda Logan DO, Thank you for referring Ms. Savana Mejia to [facility] for evaluation. My notes for this consultation are attached. If you have questions, please do not hesitate to call me. I look forward to following your patient along with you. Sincerely, Ibrahima Almanza MD

## 2020-07-20 NOTE — PROGRESS NOTES
Bernabe Alfred is a 38 y.o. female presents in office for hysterectomy     Chief Complaint   Patient presents with    Referral / Consult     Dr. Ariel Klinefelter       Patient states has had 3 pregnancies and 3 vaginal births. Patient states has had a Hx of left tubal ligation with removal of left ovary. Patient states menstrual cycle was absent for 7 months. Patient states menstrual cycle returned May 2020. Patient states menstrual cycle is has heavy flow with clotting and cramping. Patient states constantly feels drained and lightheaded during menstrual cycle. Do you have any unusual vaginal bleeding, discharge or irritation? No     Do you have any changes in your bowel movements? No     Have you been experiencing any continuous or worsening abdominal pain? No    Any urinary burning? No     Visit Vitals  BP (!) 149/102 (BP 1 Location: Right arm, BP Patient Position: Sitting)   Pulse 85   Temp 98.3 °F (36.8 °C) (Oral)   Resp 12   Ht 5' 7\" (1.702 m)   Wt 226 lb 3.2 oz (102.6 kg)   SpO2 96%   BMI 35.43 kg/m²         Health Maintenance Due   Topic Date Due    DTaP/Tdap/Td series (1 - Tdap) 09/12/1997    PAP AKA CERVICAL CYTOLOGY  09/12/1997    Lipid Screen  09/12/2016         1. Have you been to the ER, urgent care clinic since your last visit? Hospitalized since your last visit? No     2. Have you seen or consulted any other health care providers outside of the [address] [place] [address] since your last visit? Include any pap smears or colon screening.   No     3 most recent PHQ Screens 7/20/2020   Little interest or pleasure in doing things Not at all   Feeling down, depressed, irritable, or hopeless Not at all   Total Score PHQ 2 0

## 2020-07-31 ENCOUNTER — HOSPITAL ENCOUNTER (OUTPATIENT)
Dept: PREADMISSION TESTING | Age: 45
Discharge: HOME | End: 2020-07-31
Payer: MEDICARE

## 2020-07-31 DIAGNOSIS — Z01.818 PREOP TESTING: ICD-10-CM

## 2020-07-31 LAB
ATRIAL RATE: 74 BPM
CALCULATED P AXIS, ECG09: 48 DEGREES
CALCULATED R AXIS, ECG10: 76 DEGREES
CALCULATED T AXIS, ECG11: 54 DEGREES
DIAGNOSIS, 93000: NORMAL
P-R INTERVAL, ECG05: 152 MS
Q-T INTERVAL, ECG07: 362 MS
QRS DURATION, ECG06: 82 MS
QTC CALCULATION (BEZET), ECG08: 401 MS
VENTRICULAR RATE, ECG03: 74 BPM

## 2020-07-31 PROCEDURE — 87635 SARS-COV-2 COVID-19 AMP PRB: CPT

## 2020-07-31 PROCEDURE — 93005 ELECTROCARDIOGRAM TRACING: CPT

## 2020-08-01 LAB — SARS-COV-2, COV2NT: NOT DETECTED

## 2020-08-05 ENCOUNTER — TELEPHONE (OUTPATIENT)
Dept: ONCOLOGY | Age: 45
End: 2020-08-05

## 2020-08-05 ENCOUNTER — ANESTHESIA EVENT (OUTPATIENT)
Dept: SURGERY | Age: 45
End: 2020-08-05
Payer: MEDICARE

## 2020-08-05 NOTE — TELEPHONE ENCOUNTER
Confirmed patients arrival and surgery start time for 8/6/2020. Patient is to arrive at 7:30 AM with a 9:30 AM surgery start time.

## 2020-08-06 ENCOUNTER — ANESTHESIA (OUTPATIENT)
Dept: SURGERY | Age: 45
End: 2020-08-06
Payer: MEDICARE

## 2020-08-06 ENCOUNTER — HOSPITAL ENCOUNTER (OUTPATIENT)
Age: 45
Setting detail: OUTPATIENT SURGERY
Discharge: HOME | End: 2020-08-06
Attending: OBSTETRICS & GYNECOLOGY | Admitting: OBSTETRICS & GYNECOLOGY
Payer: MEDICARE

## 2020-08-06 VITALS
RESPIRATION RATE: 16 BRPM | TEMPERATURE: 97.6 F | OXYGEN SATURATION: 98 % | HEIGHT: 67 IN | WEIGHT: 223.56 LBS | DIASTOLIC BLOOD PRESSURE: 62 MMHG | BODY MASS INDEX: 35.09 KG/M2 | SYSTOLIC BLOOD PRESSURE: 113 MMHG | HEART RATE: 78 BPM

## 2020-08-06 DIAGNOSIS — G89.18 POST-OPERATIVE PAIN: Primary | ICD-10-CM

## 2020-08-06 LAB
ABO + RH BLD: NORMAL
BLOOD GROUP ANTIBODIES SERPL: NORMAL
HCG UR QL: NEGATIVE
SPECIMEN EXP DATE BLD: NORMAL

## 2020-08-06 PROCEDURE — 74011000250 HC RX REV CODE- 250: Performed by: OBSTETRICS & GYNECOLOGY

## 2020-08-06 PROCEDURE — 74011250636 HC RX REV CODE- 250/636: Performed by: OBSTETRICS & GYNECOLOGY

## 2020-08-06 PROCEDURE — 77030040830 HC CATH URETH FOL MDII -A: Performed by: OBSTETRICS & GYNECOLOGY

## 2020-08-06 PROCEDURE — 88309 TISSUE EXAM BY PATHOLOGIST: CPT

## 2020-08-06 PROCEDURE — 76210000016 HC OR PH I REC 1 TO 1.5 HR: Performed by: OBSTETRICS & GYNECOLOGY

## 2020-08-06 PROCEDURE — 77030035277 HC OBTRTR BLDELSS DISP INTU -B: Performed by: OBSTETRICS & GYNECOLOGY

## 2020-08-06 PROCEDURE — 77030034154 HC SHR COAG HARM ACE J&J -F: Performed by: OBSTETRICS & GYNECOLOGY

## 2020-08-06 PROCEDURE — 77030008477 HC STYL SATN SLP COVD -A: Performed by: ANESTHESIOLOGY

## 2020-08-06 PROCEDURE — 77030040361 HC SLV COMPR DVT MDII -B: Performed by: OBSTETRICS & GYNECOLOGY

## 2020-08-06 PROCEDURE — 77030020782 HC GWN BAIR PAWS FLX 3M -B: Performed by: OBSTETRICS & GYNECOLOGY

## 2020-08-06 PROCEDURE — 77030020703 HC SEAL CANN DISP INTU -B: Performed by: OBSTETRICS & GYNECOLOGY

## 2020-08-06 PROCEDURE — 88342 IMHCHEM/IMCYTCHM 1ST ANTB: CPT

## 2020-08-06 PROCEDURE — 77030016151 HC PROTCTR LNS DFOG COVD -B: Performed by: OBSTETRICS & GYNECOLOGY

## 2020-08-06 PROCEDURE — 77030020407 HC IV BLD WRMR ST 3M -A: Performed by: ANESTHESIOLOGY

## 2020-08-06 PROCEDURE — 76010000934 HC OR TIME 1 TO 1.5HR INTENSV - TIER 2: Performed by: OBSTETRICS & GYNECOLOGY

## 2020-08-06 PROCEDURE — 77030025805 HC MANIP UTER RUMI COOP -B: Performed by: OBSTETRICS & GYNECOLOGY

## 2020-08-06 PROCEDURE — 77030008574 HC TBNG SUC IRR STRY -B: Performed by: OBSTETRICS & GYNECOLOGY

## 2020-08-06 PROCEDURE — 77030002933 HC SUT MCRYL J&J -A: Performed by: OBSTETRICS & GYNECOLOGY

## 2020-08-06 PROCEDURE — 81025 URINE PREGNANCY TEST: CPT

## 2020-08-06 PROCEDURE — 74011250636 HC RX REV CODE- 250/636: Performed by: NURSE ANESTHETIST, CERTIFIED REGISTERED

## 2020-08-06 PROCEDURE — 76210000022 HC REC RM PH II 1.5 TO 2 HR: Performed by: OBSTETRICS & GYNECOLOGY

## 2020-08-06 PROCEDURE — 77030014063 HC TIP MANIP UTER COOP -B: Performed by: OBSTETRICS & GYNECOLOGY

## 2020-08-06 PROCEDURE — 88307 TISSUE EXAM BY PATHOLOGIST: CPT

## 2020-08-06 PROCEDURE — C9290 INJ, BUPIVACAINE LIPOSOME: HCPCS | Performed by: OBSTETRICS & GYNECOLOGY

## 2020-08-06 PROCEDURE — 74011000250 HC RX REV CODE- 250: Performed by: NURSE ANESTHETIST, CERTIFIED REGISTERED

## 2020-08-06 PROCEDURE — 76060000033 HC ANESTHESIA 1 TO 1.5 HR: Performed by: OBSTETRICS & GYNECOLOGY

## 2020-08-06 PROCEDURE — 36415 COLL VENOUS BLD VENIPUNCTURE: CPT

## 2020-08-06 PROCEDURE — 77030006643: Performed by: ANESTHESIOLOGY

## 2020-08-06 PROCEDURE — 77030037241 HC PRT ACC BLDLSS AIRSEAL CNMD -B: Performed by: OBSTETRICS & GYNECOLOGY

## 2020-08-06 PROCEDURE — 77030002966 HC SUT PDS J&J -A: Performed by: OBSTETRICS & GYNECOLOGY

## 2020-08-06 PROCEDURE — 77030008683 HC TU ET CUF COVD -A: Performed by: ANESTHESIOLOGY

## 2020-08-06 PROCEDURE — 86900 BLOOD TYPING SEROLOGIC ABO: CPT

## 2020-08-06 PROCEDURE — 74011250636 HC RX REV CODE- 250/636: Performed by: ANESTHESIOLOGY

## 2020-08-06 RX ORDER — SODIUM CHLORIDE, SODIUM LACTATE, POTASSIUM CHLORIDE, CALCIUM CHLORIDE 600; 310; 30; 20 MG/100ML; MG/100ML; MG/100ML; MG/100ML
125 INJECTION, SOLUTION INTRAVENOUS CONTINUOUS
Status: DISCONTINUED | OUTPATIENT
Start: 2020-08-06 | End: 2020-08-06 | Stop reason: HOSPADM

## 2020-08-06 RX ORDER — OXYCODONE AND ACETAMINOPHEN 5; 325 MG/1; MG/1
1 TABLET ORAL
Qty: 40 TAB | Refills: 0 | Status: SHIPPED | OUTPATIENT
Start: 2020-08-06 | End: 2020-08-20

## 2020-08-06 RX ORDER — HYDROMORPHONE HYDROCHLORIDE 1 MG/ML
0.5 INJECTION, SOLUTION INTRAMUSCULAR; INTRAVENOUS; SUBCUTANEOUS
Status: DISCONTINUED | OUTPATIENT
Start: 2020-08-06 | End: 2020-08-06 | Stop reason: HOSPADM

## 2020-08-06 RX ORDER — CEFAZOLIN SODIUM 2 G/50ML
2 SOLUTION INTRAVENOUS ONCE
Status: COMPLETED | OUTPATIENT
Start: 2020-08-06 | End: 2020-08-06

## 2020-08-06 RX ORDER — HYDROMORPHONE HYDROCHLORIDE 1 MG/ML
INJECTION, SOLUTION INTRAMUSCULAR; INTRAVENOUS; SUBCUTANEOUS AS NEEDED
Status: DISCONTINUED | OUTPATIENT
Start: 2020-08-06 | End: 2020-08-06 | Stop reason: HOSPADM

## 2020-08-06 RX ORDER — PROPOFOL 10 MG/ML
INJECTION, EMULSION INTRAVENOUS AS NEEDED
Status: DISCONTINUED | OUTPATIENT
Start: 2020-08-06 | End: 2020-08-06 | Stop reason: HOSPADM

## 2020-08-06 RX ORDER — HEPARIN SODIUM 5000 [USP'U]/ML
5000 INJECTION, SOLUTION INTRAVENOUS; SUBCUTANEOUS ONCE
Status: COMPLETED | OUTPATIENT
Start: 2020-08-06 | End: 2020-08-06

## 2020-08-06 RX ORDER — SODIUM CHLORIDE, SODIUM LACTATE, POTASSIUM CHLORIDE, CALCIUM CHLORIDE 600; 310; 30; 20 MG/100ML; MG/100ML; MG/100ML; MG/100ML
1000 INJECTION, SOLUTION INTRAVENOUS CONTINUOUS
Status: DISCONTINUED | OUTPATIENT
Start: 2020-08-06 | End: 2020-08-06 | Stop reason: HOSPADM

## 2020-08-06 RX ORDER — FENTANYL CITRATE 50 UG/ML
25 INJECTION, SOLUTION INTRAMUSCULAR; INTRAVENOUS AS NEEDED
Status: DISCONTINUED | OUTPATIENT
Start: 2020-08-06 | End: 2020-08-06 | Stop reason: HOSPADM

## 2020-08-06 RX ORDER — GLYCOPYRROLATE 0.2 MG/ML
INJECTION INTRAMUSCULAR; INTRAVENOUS AS NEEDED
Status: DISCONTINUED | OUTPATIENT
Start: 2020-08-06 | End: 2020-08-06 | Stop reason: HOSPADM

## 2020-08-06 RX ORDER — SODIUM CHLORIDE 0.9 % (FLUSH) 0.9 %
5-40 SYRINGE (ML) INJECTION EVERY 8 HOURS
Status: DISCONTINUED | OUTPATIENT
Start: 2020-08-06 | End: 2020-08-06 | Stop reason: HOSPADM

## 2020-08-06 RX ORDER — SODIUM CHLORIDE, SODIUM LACTATE, POTASSIUM CHLORIDE, CALCIUM CHLORIDE 600; 310; 30; 20 MG/100ML; MG/100ML; MG/100ML; MG/100ML
50 INJECTION, SOLUTION INTRAVENOUS CONTINUOUS
Status: DISCONTINUED | OUTPATIENT
Start: 2020-08-06 | End: 2020-08-06 | Stop reason: HOSPADM

## 2020-08-06 RX ORDER — NEOSTIGMINE METHYLSULFATE 1 MG/ML
INJECTION, SOLUTION INTRAVENOUS AS NEEDED
Status: DISCONTINUED | OUTPATIENT
Start: 2020-08-06 | End: 2020-08-06 | Stop reason: HOSPADM

## 2020-08-06 RX ORDER — NALOXONE HYDROCHLORIDE 0.4 MG/ML
0.1 INJECTION, SOLUTION INTRAMUSCULAR; INTRAVENOUS; SUBCUTANEOUS AS NEEDED
Status: DISCONTINUED | OUTPATIENT
Start: 2020-08-06 | End: 2020-08-06 | Stop reason: HOSPADM

## 2020-08-06 RX ORDER — SODIUM CHLORIDE 0.9 % (FLUSH) 0.9 %
5-40 SYRINGE (ML) INJECTION AS NEEDED
Status: DISCONTINUED | OUTPATIENT
Start: 2020-08-06 | End: 2020-08-06 | Stop reason: HOSPADM

## 2020-08-06 RX ORDER — FLUMAZENIL 0.1 MG/ML
0.2 INJECTION INTRAVENOUS
Status: DISCONTINUED | OUTPATIENT
Start: 2020-08-06 | End: 2020-08-06 | Stop reason: HOSPADM

## 2020-08-06 RX ORDER — KETAMINE HYDROCHLORIDE 10 MG/ML
INJECTION, SOLUTION INTRAMUSCULAR; INTRAVENOUS AS NEEDED
Status: DISCONTINUED | OUTPATIENT
Start: 2020-08-06 | End: 2020-08-06 | Stop reason: HOSPADM

## 2020-08-06 RX ORDER — ONDANSETRON 2 MG/ML
INJECTION INTRAMUSCULAR; INTRAVENOUS AS NEEDED
Status: DISCONTINUED | OUTPATIENT
Start: 2020-08-06 | End: 2020-08-06 | Stop reason: HOSPADM

## 2020-08-06 RX ORDER — SUCCINYLCHOLINE CHLORIDE 100 MG/5ML
SYRINGE (ML) INTRAVENOUS AS NEEDED
Status: DISCONTINUED | OUTPATIENT
Start: 2020-08-06 | End: 2020-08-06 | Stop reason: HOSPADM

## 2020-08-06 RX ORDER — MIDAZOLAM HYDROCHLORIDE 1 MG/ML
INJECTION, SOLUTION INTRAMUSCULAR; INTRAVENOUS AS NEEDED
Status: DISCONTINUED | OUTPATIENT
Start: 2020-08-06 | End: 2020-08-06 | Stop reason: HOSPADM

## 2020-08-06 RX ORDER — DEXAMETHASONE SODIUM PHOSPHATE 4 MG/ML
INJECTION, SOLUTION INTRA-ARTICULAR; INTRALESIONAL; INTRAMUSCULAR; INTRAVENOUS; SOFT TISSUE AS NEEDED
Status: DISCONTINUED | OUTPATIENT
Start: 2020-08-06 | End: 2020-08-06 | Stop reason: HOSPADM

## 2020-08-06 RX ORDER — ONDANSETRON 2 MG/ML
4 INJECTION INTRAMUSCULAR; INTRAVENOUS ONCE
Status: COMPLETED | OUTPATIENT
Start: 2020-08-06 | End: 2020-08-06

## 2020-08-06 RX ORDER — ONDANSETRON 2 MG/ML
INJECTION INTRAMUSCULAR; INTRAVENOUS
Status: DISCONTINUED
Start: 2020-08-06 | End: 2020-08-06 | Stop reason: HOSPADM

## 2020-08-06 RX ORDER — MAGNESIUM SULFATE 100 %
4 CRYSTALS MISCELLANEOUS AS NEEDED
Status: DISCONTINUED | OUTPATIENT
Start: 2020-08-06 | End: 2020-08-06 | Stop reason: HOSPADM

## 2020-08-06 RX ORDER — LIDOCAINE HYDROCHLORIDE 20 MG/ML
INJECTION, SOLUTION EPIDURAL; INFILTRATION; INTRACAUDAL; PERINEURAL AS NEEDED
Status: DISCONTINUED | OUTPATIENT
Start: 2020-08-06 | End: 2020-08-06 | Stop reason: HOSPADM

## 2020-08-06 RX ORDER — DEXMEDETOMIDINE HYDROCHLORIDE 100 UG/ML
INJECTION, SOLUTION INTRAVENOUS AS NEEDED
Status: DISCONTINUED | OUTPATIENT
Start: 2020-08-06 | End: 2020-08-06 | Stop reason: HOSPADM

## 2020-08-06 RX ORDER — ROCURONIUM BROMIDE 10 MG/ML
INJECTION, SOLUTION INTRAVENOUS AS NEEDED
Status: DISCONTINUED | OUTPATIENT
Start: 2020-08-06 | End: 2020-08-06 | Stop reason: HOSPADM

## 2020-08-06 RX ADMIN — DEXMEDETOMIDINE HYDROCHLORIDE 6 MCG: 100 INJECTION, SOLUTION INTRAVENOUS at 11:05

## 2020-08-06 RX ADMIN — SODIUM CHLORIDE, SODIUM LACTATE, POTASSIUM CHLORIDE, AND CALCIUM CHLORIDE 125 ML/HR: 600; 310; 30; 20 INJECTION, SOLUTION INTRAVENOUS at 08:33

## 2020-08-06 RX ADMIN — ROCURONIUM BROMIDE 5 MG: 10 INJECTION, SOLUTION INTRAVENOUS at 10:47

## 2020-08-06 RX ADMIN — KETAMINE HYDROCHLORIDE 50 MG: 10 INJECTION, SOLUTION INTRAMUSCULAR; INTRAVENOUS at 10:46

## 2020-08-06 RX ADMIN — ONDANSETRON HYDROCHLORIDE 4 MG: 2 INJECTION INTRAMUSCULAR; INTRAVENOUS at 10:38

## 2020-08-06 RX ADMIN — DEXAMETHASONE SODIUM PHOSPHATE 8 MG: 4 INJECTION, SOLUTION INTRAMUSCULAR; INTRAVENOUS at 10:48

## 2020-08-06 RX ADMIN — HYDROMORPHONE HYDROCHLORIDE 0.5 MG: 1 INJECTION, SOLUTION INTRAMUSCULAR; INTRAVENOUS; SUBCUTANEOUS at 12:27

## 2020-08-06 RX ADMIN — HYDROMORPHONE HYDROCHLORIDE 0.5 MG: 1 INJECTION, SOLUTION INTRAMUSCULAR; INTRAVENOUS; SUBCUTANEOUS at 12:58

## 2020-08-06 RX ADMIN — DEXMEDETOMIDINE HYDROCHLORIDE 6 MCG: 100 INJECTION, SOLUTION INTRAVENOUS at 11:23

## 2020-08-06 RX ADMIN — GLYCOPYRROLATE 0.8 MG: 0.2 INJECTION INTRAMUSCULAR; INTRAVENOUS at 11:56

## 2020-08-06 RX ADMIN — ROCURONIUM BROMIDE 45 MG: 10 INJECTION, SOLUTION INTRAVENOUS at 10:58

## 2020-08-06 RX ADMIN — PROPOFOL 120 MG: 10 INJECTION, EMULSION INTRAVENOUS at 10:48

## 2020-08-06 RX ADMIN — CEFAZOLIN SODIUM 2 G: 2 SOLUTION INTRAVENOUS at 10:52

## 2020-08-06 RX ADMIN — HEPARIN SODIUM 5000 UNITS: 5000 INJECTION INTRAVENOUS; SUBCUTANEOUS at 08:34

## 2020-08-06 RX ADMIN — DEXMEDETOMIDINE HYDROCHLORIDE 8 MCG: 100 INJECTION, SOLUTION INTRAVENOUS at 11:30

## 2020-08-06 RX ADMIN — Medication 100 MG: at 10:48

## 2020-08-06 RX ADMIN — SODIUM CHLORIDE, SODIUM LACTATE, POTASSIUM CHLORIDE, AND CALCIUM CHLORIDE 50 ML/HR: 600; 310; 30; 20 INJECTION, SOLUTION INTRAVENOUS at 08:26

## 2020-08-06 RX ADMIN — MIDAZOLAM 2 MG: 1 INJECTION INTRAMUSCULAR; INTRAVENOUS at 10:38

## 2020-08-06 RX ADMIN — Medication 5 MG: at 11:56

## 2020-08-06 RX ADMIN — LIDOCAINE HYDROCHLORIDE 100 MG: 20 INJECTION, SOLUTION EPIDURAL; INFILTRATION; INTRACAUDAL; PERINEURAL at 10:45

## 2020-08-06 RX ADMIN — ONDANSETRON 4 MG: 2 INJECTION INTRAMUSCULAR; INTRAVENOUS at 12:40

## 2020-08-06 RX ADMIN — SODIUM CHLORIDE, SODIUM LACTATE, POTASSIUM CHLORIDE, AND CALCIUM CHLORIDE: 600; 310; 30; 20 INJECTION, SOLUTION INTRAVENOUS at 11:50

## 2020-08-06 RX ADMIN — HYDROMORPHONE HYDROCHLORIDE 1 MG: 1 INJECTION, SOLUTION INTRAMUSCULAR; INTRAVENOUS; SUBCUTANEOUS at 10:46

## 2020-08-06 NOTE — PERIOP NOTES
TRANSFER - OUT REPORT:    Verbal report given to PHOEBE Anthony(name) on [employer]  being transferred to phase 2(unit) for routine post - op       Report consisted of patients Situation, Background, Assessment and   Recommendations(SBAR). Information from the following report(s) SBAR, Kardex, OR Summary, Procedure Summary, Intake/Output and MAR was reviewed with the receiving nurse. Lines:   Peripheral IV 08/06/20 Left Hand (Active)   Site Assessment Clean, dry, & intact 08/06/20 1249   Phlebitis Assessment 0 08/06/20 1249   Infiltration Assessment 0 08/06/20 1249   Dressing Status Clean, dry, & intact 08/06/20 1249   Dressing Type Transparent;Tape 08/06/20 1249   Hub Color/Line Status Pink; Infusing;Patent 08/06/20 0809       Peripheral IV 08/06/20 Right Hand (Active)   Site Assessment Clean, dry, & intact 08/06/20 1249   Phlebitis Assessment 0 08/06/20 1249   Infiltration Assessment 0 08/06/20 1249   Dressing Status Clean, dry, & intact 08/06/20 1249   Dressing Type Transparent;Tape 08/06/20 1249   Hub Color/Line Status Green; Infusing;Patent 08/06/20 0799        Opportunity for questions and clarification was provided.       Patient transported with:   Registered Nurse  Tech

## 2020-08-06 NOTE — ANESTHESIA PREPROCEDURE EVALUATION
Relevant Problems   No relevant active problems       Anesthetic History   No history of anesthetic complications            Review of Systems / Medical History  Patient summary reviewed, nursing notes reviewed and pertinent labs reviewed    Pulmonary  Within defined limits                 Neuro/Psych         Psychiatric history     Cardiovascular    Hypertension              Exercise tolerance: >4 METS     GI/Hepatic/Renal  Within defined limits              Endo/Other        Morbid obesity     Other Findings            Physical Exam    Airway  Mallampati: II  TM Distance: 4 - 6 cm  Neck ROM: normal range of motion   Mouth opening: Normal     Cardiovascular               Dental  No notable dental hx       Pulmonary                 Abdominal  GI exam deferred       Other Findings            Anesthetic Plan    ASA: 2  Anesthesia type: general          Induction: Intravenous  Anesthetic plan and risks discussed with: Patient

## 2020-08-06 NOTE — BRIEF OP NOTE
Brief Postoperative Note    Patient: Lauren Pina  YOB: 1975  MRN: [id number]    Date of Procedure: 8/6/2020     Pre-Op Diagnosis: MENORRHAGIA    Post-Op Diagnosis: Same as preoperative diagnosis.       Procedure(s):  ROBOTIC TOTAL LAPAROSCOPIC HYSTERECTOMY,RIGHT SALPINGECTOMIES, \" SPEC POP\"    Surgeon(s):  Kady Beckham MD    Surgical Assistant: None    Anesthesia: General     Estimated Blood Loss (mL): less than 50     Complications: None    Specimens:   ID Type Source Tests Collected by Time Destination   1 : CERVIX, UTERUS, RIGHT FALLOPIAN TUBE Preservative Uterus  Kady Beckham MD 8/6/2020 1138 Pathology        Implants: * No implants in log *    Drains: * No LDAs found *    Findings: normal uterus, right tube/ovary    Electronically Signed by Germaine Quintanilla MD on 8/6/2020 at 11:53 AM

## 2020-08-06 NOTE — PERIOP NOTES
Reviewed PTA medication list with patient/caregiver and patient/caregiver denies any additional medications. Patient admits to having a responsible adult care for them for at least 24 hours after surgery.     Permission granted by patient for a dual skin assessment. Dual skin assessment completed by Ankit Larson RN and Sakshi SANDHU. Urine pregnancy results negative and verified with negative.

## 2020-08-06 NOTE — PERIOP NOTES
[facility] list reviewed for duplicates. Opportunity for questions.

## 2020-08-06 NOTE — ANESTHESIA POSTPROCEDURE EVALUATION
Procedure(s):  ROBOTIC TOTAL LAPAROSCOPIC HYSTERECTOMY,RIGHT SALPINGECTOMIES, \" SPEC POP\". general    Anesthesia Post Evaluation        Comments: Post-Anesthesia Evaluation and Assessment    Cardiovascular Function/Vital Signs  /86   Pulse 81   Temp 36.2 °C (97.1 °F)   Resp 14   Ht 5' 7\" (1.702 m)   Wt 101.4 kg (223 lb 9 oz)   SpO2 98%   BMI 35.01 kg/m²     Patient is status post Procedure(s):  ROBOTIC TOTAL LAPAROSCOPIC HYSTERECTOMY,RIGHT SALPINGECTOMIES, \" SPEC POP\". Nausea/Vomiting: Controlled. Postoperative hydration reviewed and adequate. Pain:  Pain Scale 1: FLACC (08/06/20 1308)  Pain Intensity 1: 0 (08/06/20 1308)   Managed. Neurological Status:   Neuro (WDL): Within Defined Limits (08/06/20 0816)   At baseline. Mental Status and Level of Consciousness: Arousable. Pulmonary Status:   O2 Device: Nasal cannula (08/06/20 1241)   Adequate oxygenation and airway patent. Complications related to anesthesia: None    Post-anesthesia assessment completed. No concerns. Patient has met all discharge requirements.     Signed By: Yane Santoyo MD    August 6, 2020                   INITIAL Post-op Vital signs:   Vitals Value Taken Time   /86 8/6/2020  1:13 PM   Temp 36.2 °C (97.1 °F) 8/6/2020 12:15 PM   Pulse 81 8/6/2020  1:13 PM   Resp 14 8/6/2020  1:13 PM   SpO2 98 % 8/6/2020  1:13 PM

## 2020-08-06 NOTE — INTERVAL H&P NOTE
Update History & Physical 
JulyThe Patient's History and Physical of July 20 was reviewed with the patient and I examined the patient. There was no change. The surgical site was confirmed by the patient and me. Plan:  The risk, benefits, expected outcome, and alternative to the recommended procedure have been discussed with the patient. Patient understands and wants to proceed with the procedure.  
 
Electronically signed by Amara Colby MD on 8/6/2020 at 9:57 AM

## 2020-08-06 NOTE — DISCHARGE INSTRUCTIONS
Patient Education        Laparoscopic Hysterectomy: What to Expect at Home  Your Recovery     A hysterectomy is surgery to take out the uterus. In some cases, the ovaries and fallopian tubes also are taken out at the same time. You can expect to feel better and stronger each day, but you may need pain medicine for a week or two. You may get tired easily or have less energy than usual. The tiredness may last for several weeks after surgery. You will probably notice that your belly is swollen and puffy. This is common. The swelling will take several weeks to go down. You may take about 4 to 6 weeks to fully recover. It is important to avoid lifting while you are recovering so that you can heal.  This care sheet gives you a general idea about how long it will take for you to recover. But each person recovers at a different pace. Follow the steps below to get better as quickly as possible. How can you care for yourself at home? Activity  · Rest when you feel tired. · Be active. Walking is a good choice. · Allow the area to heal. Don't move quickly or lift anything heavy until you are feeling better. · You may shower 24 to 48 hours after surgery, if your doctor okays it. Pat the incision dry. Do not take a bath for the first 2 weeks, or until your doctor tells you it is okay. · Ask your doctor when it is okay for you to have sex. Diet  · You can eat your normal diet. If your stomach is upset, try bland, low-fat foods like plain rice, broiled chicken, toast, and yogurt. · If your bowel movements are not regular right after surgery, try to avoid constipation and straining. Drink plenty of water. Your doctor may suggest fiber, a stool softener, or a mild laxative. Medicines  · Your doctor will tell you if and when you can restart your medicines. He or she will also give you instructions about taking any new medicines.   · If you take aspirin or some other blood thinner, ask your doctor if and when to start taking it again. Make sure that you understand exactly what your doctor wants you to do. · Be safe with medicines. Read and follow all instructions on the label. ? If the doctor gave you a prescription medicine for pain, take it as prescribed. ? If you are not taking a prescription pain medicine, ask your doctor if you can take an over-the-counter medicine. · If your doctor prescribed antibiotics, take them as directed. Do not stop taking them just because you feel better. You need to take the full course of antibiotics. Incision care  · You may have stitches over the cuts (incisions) the doctor made in your belly. · If you have strips of tape on the cut (incision) the doctor made, leave the tape on for a week or until it falls off. · Wash the area daily with warm, soapy water, and pat it dry. Don't use hydrogen peroxide or alcohol. They can slow healing. · You may cover the area with a gauze bandage if it oozes fluid or rubs against clothing. · Change the bandage every day. Other instructions  · You may have some light vaginal bleeding. Wear sanitary pads if needed. Do not douche or use tampons. · Don't have sex until the doctor says it is okay. Follow-up care is a key part of your treatment and safety. Be sure to make and go to all appointments, and call your doctor if you are having problems. It's also a good idea to know your test results and keep a list of the medicines you take. When should you call for help? [id number] anytime you think you may need emergency care. For example, call if:  · You passed out (lost consciousness). · You have chest pain, are short of breath, or cough up blood. Call your doctor now or seek immediate medical care if:  · You have pain that does not get better after you take pain medicine. · You cannot pass stools or gas. · You have vaginal discharge that has increased in amount or smells bad. · You are sick to your stomach or cannot drink fluids.   · You have loose stitches, or your incision comes open. · Bright red blood has soaked through the bandage over your incision. · You have signs of infection, such as:  ? Increased pain, swelling, warmth, or redness. ? Red streaks leading from the incision. ? Pus draining from the incision. ? A fever. · You have bright red vaginal bleeding that soaks one or more pads in an hour, or you have large clots. · You have signs of a blood clot in your leg (called a deep vein thrombosis), such as:  ? Pain in your calf, back of the knee, thigh, or groin. ? Redness and swelling in your leg or groin. Watch closely for changes in your health, and be sure to contact your doctor if you have any problems. Where can you learn more? Go to http://www.gray.com/  Enter Q131 in the search box to learn more about \"Laparoscopic Hysterectomy: What to Expect at Home. \"  Current as of: November 8, 2019               Content Version: 12.5  © 1954-3524 [employer]. Care instructions adapted under license by [employer] (which disclaims liability or warranty for this information). If you have questions about a medical condition or this instruction, always ask your healthcare professional. [place] [address] any warranty or liability for your use of this information. DISCHARGE SUMMARY from Nurse    PATIENT INSTRUCTIONS:    After general anesthesia or intravenous sedation, for 24 hours or while taking prescription Narcotics:  · Limit your activities  · Do not drive and operate hazardous machinery  · Do not make important personal or business decisions  · Do  not drink alcoholic beverages  · If you have not urinated within 8 hours after discharge, please contact your surgeon on call.     Report the following to your surgeon:  · Excessive pain, swelling, redness or odor of or around the surgical area  · Temperature over 100.5  · Nausea and vomiting lasting longer than 4 hours or if unable to take medications  · Any signs of decreased circulation or nerve impairment to extremity: change in color, persistent  numbness, tingling, coldness or increase pain  · Any questions    What to do at Home:  Recommended activity: Activity as tolerated and no driving for today,     If you experience any of the following symptoms as per above, please follow up with Dr Jenna Mcdaniels. *  Please give a list of your current medications to your Primary Care Provider. *  Please update this list whenever your medications are discontinued, doses are      changed, or new medications (including over-the-counter products) are added. *  Please carry medication information at all times in case of emergency situations. These are general instructions for a healthy lifestyle:    No smoking/ No tobacco products/ Avoid exposure to second hand smoke  Surgeon General's Warning:  Quitting smoking now greatly reduces serious risk to your health. Obesity, smoking, and sedentary lifestyle greatly increases your risk for illness    A healthy diet, regular physical exercise & weight monitoring are important for maintaining a healthy lifestyle    You may be retaining fluid if you have a history of heart failure or if you experience any of the following symptoms:  Weight gain of 3 pounds or more overnight or 5 pounds in a week, increased swelling in our hands or feet or shortness of breath while lying flat in bed. Please call your doctor as soon as you notice any of these symptoms; do not wait until your next office visit. Patient Education        9 Things To Do If You've Been Exposed to COVID-19    Stay home. If you've been exposed, you should stay in isolation for 14 days. Don't go to school, work, or public areas. And don't use public transportation, ride-shares, or taxis unless you have no choice. Leave your home only if you need to get medical care.  But call the doctor's office first so they know you're coming, and wear a cloth face cover when you go. Call your doctor. Call your doctor or other health professional to let them know that you've been exposed. They might want you to be tested, or they may have other instructions for you. If you become sick, wear a face cover when you are around other people. It can help stop the spread of the virus when you cough or sneeze. Limit contact with people in your home. If possible, stay in a separate bedroom and use a separate bathroom. Avoid contact with pets and other animals. Cover your mouth and nose with a tissue when you cough or sneeze. Then throw it in the trash right away. Wash your hands often, especially after you cough or sneeze. Use soap and water, and scrub for at least 20 seconds. If soap and water aren't available, use an alcohol-based hand . Don't share personal household items. These include bedding, towels, cups and glasses, and eating utensils. Clean and disinfect your home every day. Use household  or disinfectant wipes or sprays. Take special care to clean things that you grab with your hands. These include doorknobs, remote controls, phones, and handles on your refrigerator and microwave. And don't forget countertops, tabletops, bathrooms, and computer keyboards. Current as of: May 8, 2020               Content Version: 12.5  © 0653-8778 [employer]. Care instructions adapted under license by [employer] (which disclaims liability or warranty for this information). If you have questions about a medical condition or this instruction, always ask your healthcare professional. [place] [address] any warranty or liability for your use of this information. Patient armband removed and shredded    The discharge information has been reviewed with the patient and caregiver. The patient and caregiver verbalized understanding.   Discharge medications reviewed with the patient and caregiver and appropriate educational materials and side effects teaching were provided.   ___________________________________________________________________________________________________________________________________

## 2020-08-06 NOTE — OP NOTES
[facility]  OPERATIVE REPORT    Name:  Lay Lara  MR#:   [id number]  :  1975  ACCOUNT #:  [de-identified]  DATE OF SERVICE:  2020    PREOPERATIVE DIAGNOSES:  Menorrhagia, endometrial polyp. POSTOPERATIVE DIAGNOSES:  Menorrhagia, endometrial polyp. PROCEDURES PERFORMED:  Robotic-assisted total laparoscopic hysterectomy, right salpingectomy, and lysis of adhesions. SURGEON:  Paramjit Montiel DO    ASSISTANT:  Adolfo Watt. ASSISTANT TASK:  Retraction, uterine manipulation, and closing. ANESTHESIA:  General.    FLUIDS:  1000 mL. COMPLICATIONS:  None. SPECIMENS REMOVED:  Uterus, cervix, right tube. IMPLANTS:  None. ESTIMATED BLOOD LOSS:  50 mL. URINE OUTPUT:  100 mL, clear urine. FINDINGS:  Laparoscopic findings revealed normal uterus, right tube and ovary. Left adnexa surgically absent. There were some omental adhesions to the anterior abdominal wall extending into the pelvis. INDICATION:  The patient is a 26-year-old who was initially seen by her GYN with abnormal uterine bleeding. She is status post endometrial ablation in 2019. Transvaginal ultrasound with suggestion of polyps. The patient desired definitive surgery. She presents today for that surgery. PROCEDURE:  The patient was taken to the operating room where general anesthesia was obtained without difficulty. She was placed in dorsal lithotomy position, prepped and draped in normal sterile fashion. Surgical time-out was taken by the entire surgical team.  A Arce catheter was placed. A sterile speculum was then placed in the patient's vagina. The anterior lip of the cervix was grasped with a single-tooth tenaculum. Cervix was then dilated, and a 6-cm ARNALDO tip with a 3.5-cm Koh cup was advanced without difficulty. Attention was then turned to the abdomen, and a Veress needle was introduced in the left upper quadrant midclavicular line.   A pneumoperitoneum was obtained at 15 mmHg. A 5-mm optical trocar was then used and inserted with findings above. At this point, 8-mm robotic trocar was placed in the left side of the abdomen under direct visualization. Using Harmonic scalpel, the omental adhesions were taken down from the anterior abdominal wall. Once this was done, an incision was made above the umbilicus, and an 1.1-EL robotic trocar was advanced. Laparoscope was introduced. The patient was placed in Trendelenburg with findings above. At this point, another 8-mm robotic trocar was placed on the right side of the abdomen under direct visualization, and a 5-mm trocar was placed in the right lower quadrant. The robot was undocked at the console. Attention was turned to the left side where the left round ligament was divided using monopolar cautery. This posterior middle leaf of the broad ligament was then incised towards the Koh cup. Attention was then turned to the right side where the right fallopian tube was elevated, and the underlying mesosalpinx was sealed and divided using both bipolar and monopolar cautery up to the uterine fundus. The right round ligament was then divided. This incision was extended superior in the right IP ligament. A window below the right uterine round ligament was then made in the peritoneum, and the right uterine ligament was sealed and divided. Posterior middle leaf of the broad ligament was incised towards the Koh cup. The uterus was then retroverted, and the anterior vesicouterine peritoneum was identified and incised along the uterine segment. The bladder flap was created using both blunt and sharp dissection. The uterine arteries were then skeletonized bilaterally, sealed and divided. The cardinal ligaments were sealed and divided. An anterior colpotomy was made. This was carried circumferentially around the Koh cup dividing the uterosacral ligaments, and the specimen was pulled through the vagina.   The vaginal cuff was then closed with 2-0 PDS in a running fashion. The pelvis was irrigated copiously, but there was some oozing noted from the ovary which was addressed using bipolar cautery. At this point, there was good hemostasis. The patient tolerated the procedure well. At this point, all instruments were removed, the robot was undocked, all trocars removed, and the pneumoperitoneum was relieved. All skin sites were closed with 4-0 Monocryl, and Exparel was injected at the conclusion of the procedure. The patient tolerated the procedure well. Sponge and needle count were correct x2, and the patient was taken to Recovery Room in stable condition.       Kingston Hayward DO [id number]  D:  08/06/2020 11:58  T:  08/06/2020 14:46  JOB #:  [id number]

## 2020-08-18 ENCOUNTER — VIRTUAL VISIT (OUTPATIENT)
Dept: ONCOLOGY | Age: 45
End: 2020-08-18

## 2020-08-18 DIAGNOSIS — N92.0 MENORRHAGIA WITH REGULAR CYCLE: Primary | ICD-10-CM

## 2020-08-18 NOTE — PROGRESS NOTES
Amara Booth is a 38 y.o. female presents for virtual visit, surgical follow up, laparoscopic hysterectomy, right salpingectomies. Chief Complaint   Patient presents with    Surgical Follow-up      Pt denies redness or discharge at incision sites. Do you have any unusual vaginal bleeding, discharge or irritation? Pt c/o occasional bleeding. Do you have any changes in your bowel movements? Pt c/o improving constipation. Have you been experiencing nausea or vomiting? No  Have you been experiencing any continuous or worsening abdominal pain? Pt c/o upper abdominal pain when she takes deep breaths and lower abdominal pressure after urinating. Any urinary burning? No    There were no vitals taken for this visit. Health Maintenance Due   Topic Date Due    DTaP/Tdap/Td series (1 - Tdap) 09/12/1997    PAP AKA CERVICAL CYTOLOGY  09/12/1997    Lipid Screen  09/12/2016    Influenza Age 5 to Adult  08/01/2020    Medicare Yearly Exam  07/20/2020         1. Have you been to the ER, urgent care clinic since your last visit? Hospitalized since your last visit? No    2. Have you seen or consulted any other health care providers outside of the [facility] since your last visit? Include any pap smears or colon screening.  No    3 most recent PHQ Screens 7/20/2020   Little interest or pleasure in doing things Not at all   Feeling down, depressed, irritable, or hopeless Not at all   Total Score PHQ 2 0       Learning Assessment 8/18/2020   PRIMARY LEARNER Patient   BARRIERS PRIMARY LEARNER NONE   CO-LEARNER CAREGIVER No   PRIMARY LANGUAGE ENGLISH   LEARNER PREFERENCE PRIMARY DEMONSTRATION   ANSWERED BY Patient   RELATIONSHIP SELF

## 2020-08-18 NOTE — PROGRESS NOTES
[facility]  [address], [address], [address] [facility]  [address], [facility] [address] [place], [place] [id number] [phone]  Jayden Wen DO      Postoperative Office Note  Patient ID:  Name: Yinka Felix  MRM: [id number]  : 1975/44 y.o. Date: 2020    SUBJECTIVE:    This is a 38 y.o.  female who presents s/p Robotic-assisted total laparoscopic hysterectomy, right salpingectomy, and lysis of adhesions on 2020  Currently she has no problems with eating, bowel movements, voiding, or their wound  Appetite is good. Eating a regular diet without difficulty. Urinating without difficulty. Bowel movements are regular. The patient is not having any pain. .    Her pathology revealed      UTERUS, CERVIX, RIGHT FALLOPIAN TUBE, HYSTERECTOMY AND SALPINGECTOMY:   UTERUS WITH MINIMAL RESIDUAL ENDOMETRIUM AND FOCAL ADENOMYOSIS. CERVIX WITH SQUAMOUS METAPLASIA. FALLOPIAN TUBE WITH BENIGN PARATUBAL CYSTS. Medications:     Current Outpatient Medications on File Prior to Visit   Medication Sig Dispense Refill    lamoTRIgine (LaMICtal) 150 mg tablet 150 mg daily. Indications: PTSD      amLODIPine (NORVASC) 10 mg tablet 10 mg daily.  Aimovig Autoinjector 140 mg/mL injection Indications: migraine prevention      Desvenlafaxine 100 mg Tb24 daily. Indications: repeated episodes of anxiety, major depressive disorder      ibuprofen (MOTRIN) 800 mg tablet 800 mg as needed.  LORazepam (ATIVAN) 1 mg tablet TAKE 1/2 TO 1 TABLET BY MOUTH DAILY AS NEEDED FOR ANXIETY      ergocalciferol (ERGOCALCIFEROL) 1,250 mcg (50,000 unit) capsule TAKE 1 CAPSULE BY MOUTH ONE TIME PER WEEK      oxyCODONE-acetaminophen (Percocet) 5-325 mg per tablet Take 1 Tab by mouth every four (4) hours as needed for Pain for up to 14 days. Max Daily Amount: 6 Tabs. 40 Tab 0     No current facility-administered medications on file prior to visit. Allergies:   No Known Allergies    OBJECTIVE:    Vitals:   Visit Vitals  [facility] 07/29/2020       Physical Examination:    General:  alert, cooperative, no distress     IMPRESSION/PLAN:    Low Lira is Doing well postoperatively. .  She has a working diagnosis of adenomyosis. The operative procedures and clinical results have been reviewed with the patient. Implications of diagnosis discussed at length. All questions answered. I will see the patient back in 2 week(s). The patient is advised to call our office with any problems or concerns. Swathi Clark DO  Gynecologic Oncology  8/18/2020/2:21 PM    I was in the office while conducting this encounter. Consent:  She and/or her healthcare decision maker is aware that this patient-initiated Telehealth encounter is a billable service, with coverage as determined by her insurance carrier. She is aware that she may receive a bill and has provided verbal consent to proceed: No - Not billable    This virtual visit was conducted via [employer]. Pursuant to the emergency declaration under the [facility], 1135 waiver authority and the [employer] and [employer] Act, this Virtual  Visit was conducted to reduce the patient's risk of exposure to COVID-19 and provide continuity of care for an established patient. Services were provided through a video synchronous discussion virtually to substitute for in-person clinic visit. Due to this being a TeleHealth evaluation, many elements of the physical examination are unable to be assessed. Total Time: minutes: 11-20 minutes.

## 2020-09-01 ENCOUNTER — OFFICE VISIT (OUTPATIENT)
Dept: ONCOLOGY | Age: 45
End: 2020-09-01

## 2020-09-01 VITALS
TEMPERATURE: 98 F | OXYGEN SATURATION: 97 % | SYSTOLIC BLOOD PRESSURE: 148 MMHG | HEART RATE: 95 BPM | WEIGHT: 221 LBS | BODY MASS INDEX: 34.69 KG/M2 | DIASTOLIC BLOOD PRESSURE: 89 MMHG | HEIGHT: 67 IN | RESPIRATION RATE: 16 BRPM

## 2020-09-01 DIAGNOSIS — Z90.710 S/P HYSTERECTOMY: Primary | ICD-10-CM

## 2020-09-01 NOTE — PATIENT INSTRUCTIONS
Laparoscopic Hysterectomy: What to Expect at [facility] Your Recovery A hysterectomy is surgery to take out the uterus. In some cases, the ovaries and fallopian tubes also are taken out at the same time. You can expect to feel better and stronger each day, but you may need pain medicine for a week or two. You may get tired easily or have less energy than usual. The tiredness may last for several weeks after surgery. You will probably notice that your belly is swollen and puffy. This is common. The swelling will take several weeks to go down. You may take about 4 to 6 weeks to fully recover. It is important to avoid lifting while you are recovering so that you can heal. 
This care sheet gives you a general idea about how long it will take for you to recover. But each person recovers at a different pace. Follow the steps below to get better as quickly as possible. How can you care for yourself at home? Activity · Rest when you feel tired. · Be active. Walking is a good choice. · Allow the area to heal. Don't move quickly or lift anything heavy until you are feeling better. · You may shower 24 to 48 hours after surgery, if your doctor okays it. Pat the incision dry. Do not take a bath for the first 2 weeks, or until your doctor tells you it is okay. · Ask your doctor when it is okay for you to have sex. Diet · You can eat your normal diet. If your stomach is upset, try bland, low-fat foods like plain rice, broiled chicken, toast, and yogurt. · If your bowel movements are not regular right after surgery, try to avoid constipation and straining. Drink plenty of water. Your doctor may suggest fiber, a stool softener, or a mild laxative. Medicines · Your doctor will tell you if and when you can restart your medicines. He or she will also give you instructions about taking any new medicines.  
· If you take aspirin or some other blood thinner, ask your doctor if and when to start taking it again. Make sure that you understand exactly what your doctor wants you to do. · Be safe with medicines. Read and follow all instructions on the label. ? If the doctor gave you a prescription medicine for pain, take it as prescribed. ? If you are not taking a prescription pain medicine, ask your doctor if you can take an over-the-counter medicine. · If your doctor prescribed antibiotics, take them as directed. Do not stop taking them just because you feel better. You need to take the full course of antibiotics. Incision care · You may have stitches over the cuts (incisions) the doctor made in your belly. · If you have strips of tape on the cut (incision) the doctor made, leave the tape on for a week or until it falls off. · Wash the area daily with warm, soapy water, and pat it dry. Don't use hydrogen peroxide or alcohol. They can slow healing. · You may cover the area with a gauze bandage if it oozes fluid or rubs against clothing. · Change the bandage every day. Other instructions · You may have some light vaginal bleeding. Wear sanitary pads if needed. Do not douche or use tampons. · Don't have sex until the doctor says it is okay. Follow-up care is a key part of your treatment and safety. Be sure to make and go to all appointments, and call your doctor if you are having problems. It's also a good idea to know your test results and keep a list of the medicines you take. When should you call for help? [id number] anytime you think you may need emergency care. For example, call if: 
· You passed out (lost consciousness). · You have chest pain, are short of breath, or cough up blood. Call your doctor now or seek immediate medical care if: 
· You have pain that does not get better after you take pain medicine. · You cannot pass stools or gas. · You have vaginal discharge that has increased in amount or smells bad. · You are sick to your stomach or cannot drink fluids. · You have loose stitches, or your incision comes open. · Bright red blood has soaked through the bandage over your incision. · You have signs of infection, such as: 
? Increased pain, swelling, warmth, or redness. ? Red streaks leading from the incision. ? Pus draining from the incision. ? A fever. · You have bright red vaginal bleeding that soaks one or more pads in an hour, or you have large clots. · You have signs of a blood clot in your leg (called a deep vein thrombosis), such as: 
? Pain in your calf, back of the knee, thigh, or groin. ? Redness and swelling in your leg or groin. Watch closely for changes in your health, and be sure to contact your doctor if you have any problems. Where can you learn more? Go to http://carlos-jimbo.info/ Enter Q131 in the search box to learn more about \"Laparoscopic Hysterectomy: What to Expect at Home. \" Current as of: November 8, 2019               Content Version: 12.5 © 4259-5636 [employer]. Care instructions adapted under license by [employer] (which disclaims liability or warranty for this information). If you have questions about a medical condition or this instruction, always ask your healthcare professional. [address] any warranty or liability for your use of this information.

## 2020-09-01 NOTE — PROGRESS NOTES
Nicole Baca is a 38 y.o. female presents in office for surgical follow up: laparoscopic hysterectomy, right salpingectomies. Chief Complaint   Patient presents with    Surgical Follow-up      Pt c/o burning sensation at incision site on right lower abdomin. Do you have any unusual vaginal bleeding, discharge or irritation? Pt c/o occasional bleeding. Do you have any changes in your bowel movements? Pt c/o occasional constipation. Have you been experiencing nausea or vomiting? No  Have you been experiencing any continuous or worsening abdominal pain? Pt c/o occasional abdominal pain with movement. Any urinary burning? No    Visit Vitals  /89 (BP 1 Location: Left arm, BP Patient Position: Sitting)   Pulse 95   Temp 98 °F (36.7 °C) (Oral)   Resp 16   Ht 5' 7\" (1.702 m)   Wt 100.2 kg (221 lb)   SpO2 97%   BMI 34.61 kg/m²         Health Maintenance Due   Topic Date Due    PAP AKA CERVICAL CYTOLOGY  09/12/1997    Lipid Screen  09/12/2016    Medicare Yearly Exam  07/20/2020    Flu Vaccine (1) 09/01/2020         1. Have you been to the ER, urgent care clinic since your last visit? Hospitalized since your last visit? No    2. Have you seen or consulted any other health care providers outside of the [address] [place] [address] since your last visit? Include any pap smears or colon screening.  No    3 most recent PHQ Screens 7/20/2020   Little interest or pleasure in doing things Not at all   Feeling down, depressed, irritable, or hopeless Not at all   Total Score PHQ 2 0       Learning Assessment 8/18/2020   PRIMARY LEARNER Patient   BARRIERS PRIMARY LEARNER NONE   CO-LEARNER CAREGIVER No   PRIMARY LANGUAGE ENGLISH   LEARNER PREFERENCE PRIMARY DEMONSTRATION   ANSWERED BY Patient   RELATIONSHIP SELF

## 2020-09-01 NOTE — PROGRESS NOTES
[address], [address], [address] [address] [place]  [phone]  [facility]        Postoperative Office Note  Patient ID:  Name: Kelsey Langley  MRM: [id number]  : 1975/44 y.o. Date: 2020    SUBJECTIVE:    This is a 38 y.o.  female who presents s/p Robotic-assisted total laparoscopic hysterectomy, right salpingectomy, and lysis of adhesions on 2020. Currently she has no problems with eating, bowel movements, voiding, or her wound. Appetite is good. Eating a regular diet without difficulty. Urinating without difficulty. Bowel movements are regular. The patient reports intermittent burning pain to right trochar site, well managed without medication. Her pathology revealed      UTERUS, CERVIX, RIGHT FALLOPIAN TUBE, HYSTERECTOMY AND SALPINGECTOMY:   UTERUS WITH MINIMAL RESIDUAL ENDOMETRIUM AND FOCAL ADENOMYOSIS. CERVIX WITH SQUAMOUS METAPLASIA. FALLOPIAN TUBE WITH BENIGN PARATUBAL CYSTS. Medications:     Current Outpatient Medications on File Prior to Visit   Medication Sig Dispense Refill    lamoTRIgine (LaMICtal) 150 mg tablet 150 mg daily. Indications: PTSD      amLODIPine (NORVASC) 10 mg tablet 10 mg daily.  Aimovig Autoinjector 140 mg/mL injection Indications: migraine prevention      Desvenlafaxine 100 mg Tb24 daily. Indications: repeated episodes of anxiety, major depressive disorder      ibuprofen (MOTRIN) 800 mg tablet 800 mg as needed.  LORazepam (ATIVAN) 1 mg tablet TAKE 1/2 TO 1 TABLET BY MOUTH DAILY AS NEEDED FOR ANXIETY      ergocalciferol (ERGOCALCIFEROL) 1,250 mcg (50,000 unit) capsule TAKE 1 CAPSULE BY MOUTH ONE TIME PER WEEK       No current facility-administered medications on file prior to visit.         Allergies:   No Known Allergies    OBJECTIVE:    Vitals:   Visit Vitals  /89 (BP 1 Location: Left arm, BP Patient Position: Sitting)   Pulse 95   Temp 98 °F (36.7 °C) (Oral)   Resp 16   Ht 5' 7\" (1.702 m)   Wt 100.2 kg (221 lb)   LMP 07/29/2020   SpO2 97%   BMI 34.61 kg/m²   Physical Examination:  GENERAL MOISES: in no apparent distress and well developed and well nourished   MUSCULOSKEL: no joint tenderness, deformity or swelling   INTEGUMENT:  warm and dry, no rashes or lesions   ABDOMEN . soft, NT, ND, No masses appreciated   EXTREMITIES: extremities normal, atraumatic, no cyanosis or edema   PELVIC: NEFG, Spec exam revealed vaginal cuff intact, BME revealed vaginal cuff intact, nontender   RECTAL: deferred   YONG SURVEY: Cervical, supraclavicular, axillary and inguinal nodes normal.   NEURO: Grossly normal     IMPRESSION/PLAN:    Nicole Baca is doing well postoperatively. She has a working diagnosis of adenomyosis. The operative procedures and clinical results have been reviewed with the patient. Implications of diagnosis discussed at length. All questions answered. Patient may resume pre-op level of activity as tolerated. Reinforced additional 4 weeks of pelvic rest.  Patient encouraged to follow up with PCP or primary gyn for annual WWE. I will see the patient back PRN. The patient is advised to call our office with any problems or concerns.     Charanjit Aguilar [facility]  Gynecologic Oncology  9/1/2020/2:01 PM

## 2020-09-01 NOTE — LETTER
9/1/20 Patient: Belinda Cherry YOB: 1975 Date of Visit: 9/1/2020 Christy Albrecht, [address] [facility] [address] [place] [address] VIA Facsimile: [phone] Dear Christy Albrecht DO, Thank you for referring Ms. Savana Mejia to [facility] for evaluation. My notes for this consultation are attached. If you have questions, please do not hesitate to call me. I look forward to following your patient along with you. Sincerely, Roddy Henderson NP

## (undated) DEVICE — NEEDLE HYPO 21GA L1.5IN INTRAMUSCULAR S STL LATCH BVL UP

## (undated) DEVICE — 40595 XL TRENDELENBURG POSITIONING KIT: Brand: 40595 XL TRENDELENBURG POSITIONING KIT

## (undated) DEVICE — SOL IRRIGATION INJ NACL 0.9% 500ML BTL

## (undated) DEVICE — SOLUTION LACTATED RINGERS INJECTION USP

## (undated) DEVICE — OBTRTR BLDELSS 8MM DISP -- DA VINCI - SNGL USE

## (undated) DEVICE — INTENDED FOR TISSUE SEPARATION, AND OTHER PROCEDURES THAT REQUIRE A SHARP SURGICAL BLADE TO PUNCTURE OR CUT.: Brand: BARD-PARKER ® CARBON RIB-BACK BLADES

## (undated) DEVICE — PREP SKN CHLRAPRP APL 26ML STR --

## (undated) DEVICE — KIT DRP 3 ARM ACC DISP ENDOWRIST DA VINCI SI

## (undated) DEVICE — SUT MONOCRYL PLUS UD 4-0 --

## (undated) DEVICE — REM POLYHESIVE ADULT PATIENT RETURN ELECTRODE: Brand: VALLEYLAB

## (undated) DEVICE — SYSTEM ES CUP DIA3.5CM PNEUMO OCCL BLLN DISP FOR CLIN POS

## (undated) DEVICE — TOWEL,OR,DSP,ST,BLUE,STD,4/PK,20PK/CS: Brand: MEDLINE

## (undated) DEVICE — SYR 10ML LUER LOK 1/5ML GRAD --

## (undated) DEVICE — TIP MANIP UTER RUMI 6.7MMX6CM --

## (undated) DEVICE — GARMENT,MEDLINE,DVT,INT,CALF,MED, GEN2: Brand: MEDLINE

## (undated) DEVICE — TRAP MUCUS SPECIMEN 40ML -- MEDICHOICE

## (undated) DEVICE — AIRSEAL 5 MM ACCESS PORT AND LOW PROFILE OBTURATOR WITH BLADELESS OPTICAL TIP, 100 MM LENGTH: Brand: AIRSEAL

## (undated) DEVICE — CORD BPLR L12FT DISP

## (undated) DEVICE — SHEARS ENDOSCP L36CM DIA5MM ULTRASONIC CRV TIP W/ ADV

## (undated) DEVICE — MAYO STAND COVER: Brand: CONVERTORS

## (undated) DEVICE — VISUALIZATION SYSTEM: Brand: CLEARIFY

## (undated) DEVICE — DRAPE,UTILITY,XL,4/PK,STERILE: Brand: MEDLINE

## (undated) DEVICE — BASIC SINGLE BASIN 1-LF: Brand: MEDLINE INDUSTRIES, INC.

## (undated) DEVICE — STERILE POLYISOPRENE POWDER-FREE SURGICAL GLOVES: Brand: PROTEXIS

## (undated) DEVICE — ROBOTIC PACK: Brand: MEDLINE INDUSTRIES, INC.

## (undated) DEVICE — KIT,ANTI FOG,W/SPONGE & FLUID,SOFT PACK: Brand: MEDLINE

## (undated) DEVICE — SEAL CANN F/12MM 8.5-13MM DISP -- DA VINCI

## (undated) DEVICE — STERILE POLYISOPRENE POWDER-FREE SURGICAL GLOVES WITH EMOLLIENT COATING: Brand: PROTEXIS

## (undated) DEVICE — TOTAL TRAY, DB, 100% SILI FOLEY, 16FR 10: Brand: MEDLINE

## (undated) DEVICE — SUTURE PDS II SZ 2-0 L27IN ABSRB VLT L36MM CT-1 1/2 CIR Z339H

## (undated) DEVICE — COVER MPLR TIP CRV SCIS ACC DA VINCI

## (undated) DEVICE — DISPOSABLE SUCTION/IRRIGATOR TUBE SET WITH TIP: Brand: AHTO

## (undated) DEVICE — TRI-LUMEN FILTERED TUBE SET WITH ACTIVATED CHARCOAL FILTER: Brand: AIRSEAL